# Patient Record
Sex: MALE | Race: WHITE | NOT HISPANIC OR LATINO | Employment: OTHER | URBAN - METROPOLITAN AREA
[De-identification: names, ages, dates, MRNs, and addresses within clinical notes are randomized per-mention and may not be internally consistent; named-entity substitution may affect disease eponyms.]

---

## 2017-05-30 ENCOUNTER — COMMUNICATION - HEALTHEAST (OUTPATIENT)
Dept: INTERNAL MEDICINE | Facility: CLINIC | Age: 67
End: 2017-05-30

## 2017-07-29 ENCOUNTER — COMMUNICATION - HEALTHEAST (OUTPATIENT)
Dept: INTERNAL MEDICINE | Facility: CLINIC | Age: 67
End: 2017-07-29

## 2017-07-29 DIAGNOSIS — E78.5 HYPERLIPIDEMIA: ICD-10-CM

## 2017-08-10 ENCOUNTER — COMMUNICATION - HEALTHEAST (OUTPATIENT)
Dept: INTERNAL MEDICINE | Facility: CLINIC | Age: 67
End: 2017-08-10

## 2017-10-16 ENCOUNTER — COMMUNICATION - HEALTHEAST (OUTPATIENT)
Dept: INTERNAL MEDICINE | Facility: CLINIC | Age: 67
End: 2017-10-16

## 2017-10-17 ENCOUNTER — RECORDS - HEALTHEAST (OUTPATIENT)
Dept: ADMINISTRATIVE | Facility: OTHER | Age: 67
End: 2017-10-17

## 2017-10-17 ENCOUNTER — OFFICE VISIT - HEALTHEAST (OUTPATIENT)
Dept: INTERNAL MEDICINE | Facility: CLINIC | Age: 67
End: 2017-10-17

## 2017-10-17 ENCOUNTER — AMBULATORY - HEALTHEAST (OUTPATIENT)
Dept: INTERNAL MEDICINE | Facility: CLINIC | Age: 67
End: 2017-10-17

## 2017-10-17 DIAGNOSIS — Z00.00 WELCOME TO MEDICARE PREVENTIVE VISIT: ICD-10-CM

## 2017-10-17 DIAGNOSIS — M25.522 LEFT ELBOW PAIN: ICD-10-CM

## 2017-10-17 DIAGNOSIS — Z51.81 MEDICATION MONITORING ENCOUNTER: ICD-10-CM

## 2017-10-17 DIAGNOSIS — E78.5 HYPERLIPIDEMIA: ICD-10-CM

## 2017-10-17 DIAGNOSIS — M10.9 GOUT: ICD-10-CM

## 2017-10-17 DIAGNOSIS — R63.5 WEIGHT GAIN: ICD-10-CM

## 2017-10-17 DIAGNOSIS — Z12.5 SCREENING FOR MALIGNANT NEOPLASM OF PROSTATE: ICD-10-CM

## 2017-10-17 DIAGNOSIS — F17.201 TOBACCO ABUSE, IN REMISSION: ICD-10-CM

## 2017-10-17 DIAGNOSIS — I10 ESSENTIAL HYPERTENSION: ICD-10-CM

## 2017-10-17 LAB
CHOLEST SERPL-MCNC: 188 MG/DL
FASTING STATUS PATIENT QL REPORTED: ABNORMAL
HDLC SERPL-MCNC: 50 MG/DL
LDLC SERPL CALC-MCNC: 69 MG/DL
PSA SERPL-MCNC: 1.4 NG/ML (ref 0–4.5)
TRIGL SERPL-MCNC: 347 MG/DL

## 2017-10-17 ASSESSMENT — MIFFLIN-ST. JEOR: SCORE: 1750.35

## 2017-10-18 ENCOUNTER — AMBULATORY - HEALTHEAST (OUTPATIENT)
Dept: INTERNAL MEDICINE | Facility: CLINIC | Age: 67
End: 2017-10-18

## 2017-10-18 DIAGNOSIS — F17.201 TOBACCO ABUSE, IN REMISSION: ICD-10-CM

## 2017-10-18 LAB — HCV AB SERPL QL IA: NEGATIVE

## 2017-11-20 ENCOUNTER — COMMUNICATION - HEALTHEAST (OUTPATIENT)
Dept: INTERNAL MEDICINE | Facility: CLINIC | Age: 67
End: 2017-11-20

## 2017-11-20 DIAGNOSIS — I10 ESSENTIAL HYPERTENSION: ICD-10-CM

## 2017-12-12 ENCOUNTER — RECORDS - HEALTHEAST (OUTPATIENT)
Dept: ADMINISTRATIVE | Facility: OTHER | Age: 67
End: 2017-12-12

## 2018-01-03 ENCOUNTER — COMMUNICATION - HEALTHEAST (OUTPATIENT)
Dept: INTERNAL MEDICINE | Facility: CLINIC | Age: 68
End: 2018-01-03

## 2018-01-03 DIAGNOSIS — I10 ESSENTIAL HYPERTENSION: ICD-10-CM

## 2018-01-29 ENCOUNTER — RECORDS - HEALTHEAST (OUTPATIENT)
Dept: ADMINISTRATIVE | Facility: OTHER | Age: 68
End: 2018-01-29

## 2018-04-05 ENCOUNTER — COMMUNICATION - HEALTHEAST (OUTPATIENT)
Dept: INTERNAL MEDICINE | Facility: CLINIC | Age: 68
End: 2018-04-05

## 2018-04-05 DIAGNOSIS — E78.5 HYPERLIPEMIA: ICD-10-CM

## 2018-05-18 ENCOUNTER — RECORDS - HEALTHEAST (OUTPATIENT)
Dept: ADMINISTRATIVE | Facility: OTHER | Age: 68
End: 2018-05-18

## 2018-07-26 ENCOUNTER — COMMUNICATION - HEALTHEAST (OUTPATIENT)
Dept: INTERNAL MEDICINE | Facility: CLINIC | Age: 68
End: 2018-07-26

## 2018-07-26 DIAGNOSIS — E78.5 HYPERLIPIDEMIA: ICD-10-CM

## 2018-08-07 ENCOUNTER — COMMUNICATION - HEALTHEAST (OUTPATIENT)
Dept: INTERNAL MEDICINE | Facility: CLINIC | Age: 68
End: 2018-08-07

## 2018-08-07 DIAGNOSIS — M10.9 GOUT: ICD-10-CM

## 2018-10-01 ENCOUNTER — COMMUNICATION - HEALTHEAST (OUTPATIENT)
Dept: INTERNAL MEDICINE | Facility: CLINIC | Age: 68
End: 2018-10-01

## 2018-10-01 DIAGNOSIS — I10 ESSENTIAL HYPERTENSION: ICD-10-CM

## 2018-10-17 ENCOUNTER — AMBULATORY - HEALTHEAST (OUTPATIENT)
Dept: INTERNAL MEDICINE | Facility: CLINIC | Age: 68
End: 2018-10-17

## 2018-10-17 DIAGNOSIS — Z00.00 ROUTINE HEALTH MAINTENANCE: ICD-10-CM

## 2018-10-23 ENCOUNTER — AMBULATORY - HEALTHEAST (OUTPATIENT)
Dept: INTERNAL MEDICINE | Facility: CLINIC | Age: 68
End: 2018-10-23

## 2018-10-23 ENCOUNTER — RECORDS - HEALTHEAST (OUTPATIENT)
Dept: ADMINISTRATIVE | Facility: OTHER | Age: 68
End: 2018-10-23

## 2018-10-23 ENCOUNTER — OFFICE VISIT - HEALTHEAST (OUTPATIENT)
Dept: INTERNAL MEDICINE | Facility: CLINIC | Age: 68
End: 2018-10-23

## 2018-10-23 DIAGNOSIS — Z51.81 MEDICATION MONITORING ENCOUNTER: ICD-10-CM

## 2018-10-23 DIAGNOSIS — F17.201 TOBACCO ABUSE, IN REMISSION: ICD-10-CM

## 2018-10-23 DIAGNOSIS — Z12.5 SCREENING FOR PROSTATE CANCER: ICD-10-CM

## 2018-10-23 DIAGNOSIS — Z00.00 MEDICARE ANNUAL WELLNESS VISIT, SUBSEQUENT: ICD-10-CM

## 2018-10-23 DIAGNOSIS — I10 ESSENTIAL HYPERTENSION: ICD-10-CM

## 2018-10-23 DIAGNOSIS — L01.00 IMPETIGO: ICD-10-CM

## 2018-10-23 DIAGNOSIS — M10.9 GOUT: ICD-10-CM

## 2018-10-23 DIAGNOSIS — E78.5 HYPERLIPIDEMIA: ICD-10-CM

## 2018-10-23 LAB
ALBUMIN UR-MCNC: ABNORMAL MG/DL
APPEARANCE UR: ABNORMAL
BACTERIA #/AREA URNS HPF: ABNORMAL HPF
BILIRUB UR QL STRIP: ABNORMAL
COLOR UR AUTO: YELLOW
ERYTHROCYTE [DISTWIDTH] IN BLOOD BY AUTOMATED COUNT: 11.1 % (ref 11–14.5)
GLUCOSE UR STRIP-MCNC: NEGATIVE MG/DL
HCT VFR BLD AUTO: 39.6 % (ref 40–54)
HGB BLD-MCNC: 13.1 G/DL (ref 14–18)
HGB UR QL STRIP: NEGATIVE
KETONES UR STRIP-MCNC: NEGATIVE MG/DL
LEUKOCYTE ESTERASE UR QL STRIP: NEGATIVE
MCH RBC QN AUTO: 31.9 PG (ref 27–34)
MCHC RBC AUTO-ENTMCNC: 33 G/DL (ref 32–36)
MCV RBC AUTO: 96 FL (ref 80–100)
NITRATE UR QL: NEGATIVE
PH UR STRIP: 5.5 [PH] (ref 5–8)
PLATELET # BLD AUTO: 297 THOU/UL (ref 140–440)
PMV BLD AUTO: 8.2 FL (ref 7–10)
RBC # BLD AUTO: 4.1 MILL/UL (ref 4.4–6.2)
RBC #/AREA URNS AUTO: ABNORMAL HPF
SP GR UR STRIP: 1.01 (ref 1–1.03)
SQUAMOUS #/AREA URNS AUTO: ABNORMAL LPF
UROBILINOGEN UR STRIP-ACNC: ABNORMAL
WBC #/AREA URNS AUTO: ABNORMAL HPF
WBC: 9.6 THOU/UL (ref 4–11)

## 2018-10-23 RX ORDER — CHLORAL HYDRATE 500 MG
2 CAPSULE ORAL DAILY
Status: SHIPPED | COMMUNITY
Start: 2018-10-23 | End: 2022-02-04

## 2018-10-23 ASSESSMENT — MIFFLIN-ST. JEOR: SCORE: 1742.41

## 2018-10-24 ENCOUNTER — COMMUNICATION - HEALTHEAST (OUTPATIENT)
Dept: INTERNAL MEDICINE | Facility: CLINIC | Age: 68
End: 2018-10-24

## 2018-10-24 LAB
ALBUMIN SERPL-MCNC: 3.7 G/DL (ref 3.5–5)
ALP SERPL-CCNC: 59 U/L (ref 45–120)
ALT SERPL W P-5'-P-CCNC: 29 U/L (ref 0–45)
ANION GAP SERPL CALCULATED.3IONS-SCNC: 12 MMOL/L (ref 5–18)
AST SERPL W P-5'-P-CCNC: 21 U/L (ref 0–40)
BILIRUB DIRECT SERPL-MCNC: 0.3 MG/DL
BILIRUB SERPL-MCNC: 0.7 MG/DL (ref 0–1)
BUN SERPL-MCNC: 23 MG/DL (ref 8–22)
CALCIUM SERPL-MCNC: 10.6 MG/DL (ref 8.5–10.5)
CHLORIDE BLD-SCNC: 102 MMOL/L (ref 98–107)
CHOLEST SERPL-MCNC: 157 MG/DL
CO2 SERPL-SCNC: 22 MMOL/L (ref 22–31)
CREAT SERPL-MCNC: 1.12 MG/DL (ref 0.7–1.3)
FASTING STATUS PATIENT QL REPORTED: NORMAL
GFR SERPL CREATININE-BSD FRML MDRD: >60 ML/MIN/1.73M2
GLUCOSE BLD-MCNC: 82 MG/DL (ref 70–125)
HDLC SERPL-MCNC: 62 MG/DL
LDLC SERPL CALC-MCNC: 70 MG/DL
POTASSIUM BLD-SCNC: 4.4 MMOL/L (ref 3.5–5)
PROT SERPL-MCNC: 7 G/DL (ref 6–8)
PSA SERPL-MCNC: 0.8 NG/ML (ref 0–4.5)
SODIUM SERPL-SCNC: 136 MMOL/L (ref 136–145)
TRIGL SERPL-MCNC: 123 MG/DL
URATE SERPL-MCNC: 5.4 MG/DL (ref 3–8)

## 2018-10-25 ENCOUNTER — COMMUNICATION - HEALTHEAST (OUTPATIENT)
Dept: INTERNAL MEDICINE | Facility: CLINIC | Age: 68
End: 2018-10-25

## 2018-10-25 DIAGNOSIS — E78.5 HYPERLIPIDEMIA: ICD-10-CM

## 2018-10-25 DIAGNOSIS — R93.89 ABNORMAL CT OF THE CHEST: ICD-10-CM

## 2018-10-29 ENCOUNTER — RECORDS - HEALTHEAST (OUTPATIENT)
Dept: ADMINISTRATIVE | Facility: OTHER | Age: 68
End: 2018-10-29

## 2018-10-31 ENCOUNTER — COMMUNICATION - HEALTHEAST (OUTPATIENT)
Dept: INTERNAL MEDICINE | Facility: CLINIC | Age: 68
End: 2018-10-31

## 2018-10-31 ENCOUNTER — AMBULATORY - HEALTHEAST (OUTPATIENT)
Dept: INTERNAL MEDICINE | Facility: CLINIC | Age: 68
End: 2018-10-31

## 2018-10-31 DIAGNOSIS — R91.8 RIGHT LOWER LOBE LUNG MASS: ICD-10-CM

## 2018-11-01 ENCOUNTER — AMBULATORY - HEALTHEAST (OUTPATIENT)
Dept: SCHEDULING | Facility: CLINIC | Age: 68
End: 2018-11-01

## 2018-11-01 DIAGNOSIS — R91.8 RIGHT LOWER LOBE LUNG MASS: ICD-10-CM

## 2018-11-06 ENCOUNTER — COMMUNICATION - HEALTHEAST (OUTPATIENT)
Dept: INTERNAL MEDICINE | Facility: CLINIC | Age: 68
End: 2018-11-06

## 2018-11-16 ENCOUNTER — COMMUNICATION - HEALTHEAST (OUTPATIENT)
Dept: INTERNAL MEDICINE | Facility: CLINIC | Age: 68
End: 2018-11-16

## 2018-11-16 DIAGNOSIS — I10 ESSENTIAL HYPERTENSION: ICD-10-CM

## 2018-12-12 ENCOUNTER — RECORDS - HEALTHEAST (OUTPATIENT)
Dept: ADMINISTRATIVE | Facility: OTHER | Age: 68
End: 2018-12-12

## 2019-01-04 ENCOUNTER — COMMUNICATION - HEALTHEAST (OUTPATIENT)
Dept: INTERNAL MEDICINE | Facility: CLINIC | Age: 69
End: 2019-01-04

## 2019-01-04 DIAGNOSIS — I10 ESSENTIAL HYPERTENSION: ICD-10-CM

## 2019-01-21 ENCOUNTER — COMMUNICATION - HEALTHEAST (OUTPATIENT)
Dept: INTERNAL MEDICINE | Facility: CLINIC | Age: 69
End: 2019-01-21

## 2019-01-29 ENCOUNTER — OFFICE VISIT - HEALTHEAST (OUTPATIENT)
Dept: INTERNAL MEDICINE | Facility: CLINIC | Age: 69
End: 2019-01-29

## 2019-01-29 DIAGNOSIS — M25.562 ACUTE PAIN OF LEFT KNEE: ICD-10-CM

## 2019-01-29 ASSESSMENT — MIFFLIN-ST. JEOR: SCORE: 1783.24

## 2019-02-25 ENCOUNTER — RECORDS - HEALTHEAST (OUTPATIENT)
Dept: ADMINISTRATIVE | Facility: OTHER | Age: 69
End: 2019-02-25

## 2019-02-27 ENCOUNTER — RECORDS - HEALTHEAST (OUTPATIENT)
Dept: ADMINISTRATIVE | Facility: OTHER | Age: 69
End: 2019-02-27

## 2019-02-27 ENCOUNTER — COMMUNICATION - HEALTHEAST (OUTPATIENT)
Dept: INTERNAL MEDICINE | Facility: CLINIC | Age: 69
End: 2019-02-27

## 2019-02-27 DIAGNOSIS — E78.5 HYPERLIPEMIA: ICD-10-CM

## 2019-04-10 ENCOUNTER — RECORDS - HEALTHEAST (OUTPATIENT)
Dept: ADMINISTRATIVE | Facility: OTHER | Age: 69
End: 2019-04-10

## 2019-05-22 ENCOUNTER — RECORDS - HEALTHEAST (OUTPATIENT)
Dept: ADMINISTRATIVE | Facility: OTHER | Age: 69
End: 2019-05-22

## 2019-08-05 ENCOUNTER — RECORDS - HEALTHEAST (OUTPATIENT)
Dept: ADMINISTRATIVE | Facility: OTHER | Age: 69
End: 2019-08-05

## 2019-08-20 ENCOUNTER — RECORDS - HEALTHEAST (OUTPATIENT)
Dept: ADMINISTRATIVE | Facility: OTHER | Age: 69
End: 2019-08-20

## 2019-09-30 ENCOUNTER — COMMUNICATION - HEALTHEAST (OUTPATIENT)
Dept: INTERNAL MEDICINE | Facility: CLINIC | Age: 69
End: 2019-09-30

## 2019-09-30 DIAGNOSIS — I10 ESSENTIAL HYPERTENSION: ICD-10-CM

## 2019-10-21 ENCOUNTER — AMBULATORY - HEALTHEAST (OUTPATIENT)
Dept: INTERNAL MEDICINE | Facility: CLINIC | Age: 69
End: 2019-10-21

## 2019-10-21 ENCOUNTER — OFFICE VISIT - HEALTHEAST (OUTPATIENT)
Dept: INTERNAL MEDICINE | Facility: CLINIC | Age: 69
End: 2019-10-21

## 2019-10-21 ENCOUNTER — COMMUNICATION - HEALTHEAST (OUTPATIENT)
Dept: LAB | Facility: CLINIC | Age: 69
End: 2019-10-21

## 2019-10-21 ENCOUNTER — RECORDS - HEALTHEAST (OUTPATIENT)
Dept: GENERAL RADIOLOGY | Facility: CLINIC | Age: 69
End: 2019-10-21

## 2019-10-21 DIAGNOSIS — M84.352D STRESS FRACTURE OF LEFT FEMUR WITH ROUTINE HEALING: ICD-10-CM

## 2019-10-21 DIAGNOSIS — Z87.39 HISTORY OF GOUT: ICD-10-CM

## 2019-10-21 DIAGNOSIS — E78.5 HYPERLIPIDEMIA, UNSPECIFIED HYPERLIPIDEMIA TYPE: ICD-10-CM

## 2019-10-21 DIAGNOSIS — M25.562 ACUTE PAIN OF LEFT KNEE: ICD-10-CM

## 2019-10-21 DIAGNOSIS — Z12.5 SCREENING FOR PROSTATE CANCER: ICD-10-CM

## 2019-10-21 DIAGNOSIS — Z51.81 MEDICATION MONITORING ENCOUNTER: ICD-10-CM

## 2019-10-21 DIAGNOSIS — M10.00 IDIOPATHIC GOUT, UNSPECIFIED CHRONICITY, UNSPECIFIED SITE: ICD-10-CM

## 2019-10-21 DIAGNOSIS — I10 ESSENTIAL HYPERTENSION: ICD-10-CM

## 2019-10-21 DIAGNOSIS — Z91.89 RISK OF EXPOSURE TO LYME DISEASE: ICD-10-CM

## 2019-10-21 DIAGNOSIS — R10.31 RIGHT LOWER QUADRANT PAIN: ICD-10-CM

## 2019-10-21 DIAGNOSIS — R10.31 RIGHT GROIN PAIN: ICD-10-CM

## 2019-10-21 LAB — PSA SERPL-MCNC: 1 NG/ML (ref 0–4.5)

## 2019-10-21 ASSESSMENT — MIFFLIN-ST. JEOR: SCORE: 1737.88

## 2019-10-22 ENCOUNTER — COMMUNICATION - HEALTHEAST (OUTPATIENT)
Dept: SCHEDULING | Facility: CLINIC | Age: 69
End: 2019-10-22

## 2019-10-22 ENCOUNTER — AMBULATORY - HEALTHEAST (OUTPATIENT)
Dept: INTERNAL MEDICINE | Facility: CLINIC | Age: 69
End: 2019-10-22

## 2019-10-22 DIAGNOSIS — E78.5 HYPERLIPIDEMIA: ICD-10-CM

## 2019-10-22 DIAGNOSIS — E55.9 VITAMIN D DEFICIENCY: ICD-10-CM

## 2019-10-22 LAB
25(OH)D3 SERPL-MCNC: 15 NG/ML (ref 30–80)
25(OH)D3 SERPL-MCNC: 15 NG/ML (ref 30–80)
B BURGDOR IGG+IGM SER QL: 0.05 INDEX VALUE

## 2019-10-23 ENCOUNTER — RECORDS - HEALTHEAST (OUTPATIENT)
Dept: ADMINISTRATIVE | Facility: OTHER | Age: 69
End: 2019-10-23

## 2019-10-23 ENCOUNTER — RECORDS - HEALTHEAST (OUTPATIENT)
Dept: BONE DENSITY | Facility: CLINIC | Age: 69
End: 2019-10-23

## 2019-10-23 DIAGNOSIS — M84.352D STRESS FRACTURE, LEFT FEMUR, SUBSEQUENT ENCOUNTER FOR FRACTURE WITH ROUTINE HEALING: ICD-10-CM

## 2019-10-27 ENCOUNTER — COMMUNICATION - HEALTHEAST (OUTPATIENT)
Dept: INTERNAL MEDICINE | Facility: CLINIC | Age: 69
End: 2019-10-27

## 2019-11-06 ENCOUNTER — AMBULATORY - HEALTHEAST (OUTPATIENT)
Dept: LAB | Facility: CLINIC | Age: 69
End: 2019-11-06

## 2019-11-06 DIAGNOSIS — I10 ESSENTIAL HYPERTENSION: ICD-10-CM

## 2019-11-06 DIAGNOSIS — M10.00 IDIOPATHIC GOUT, UNSPECIFIED CHRONICITY, UNSPECIFIED SITE: ICD-10-CM

## 2019-11-06 DIAGNOSIS — Z51.81 MEDICATION MONITORING ENCOUNTER: ICD-10-CM

## 2019-11-06 DIAGNOSIS — E78.5 HYPERLIPIDEMIA, UNSPECIFIED HYPERLIPIDEMIA TYPE: ICD-10-CM

## 2019-11-06 LAB
ALBUMIN SERPL-MCNC: 4 G/DL (ref 3.5–5)
ALBUMIN UR-MCNC: NEGATIVE MG/DL
ALP SERPL-CCNC: 75 U/L (ref 45–120)
ALT SERPL W P-5'-P-CCNC: 24 U/L (ref 0–45)
ANION GAP SERPL CALCULATED.3IONS-SCNC: 8 MMOL/L (ref 5–18)
APPEARANCE UR: CLEAR
AST SERPL W P-5'-P-CCNC: 21 U/L (ref 0–40)
BILIRUB DIRECT SERPL-MCNC: 0.2 MG/DL
BILIRUB SERPL-MCNC: 0.6 MG/DL (ref 0–1)
BILIRUB UR QL STRIP: NEGATIVE
BUN SERPL-MCNC: 19 MG/DL (ref 8–22)
CALCIUM SERPL-MCNC: 9.9 MG/DL (ref 8.5–10.5)
CHLORIDE BLD-SCNC: 106 MMOL/L (ref 98–107)
CHOLEST SERPL-MCNC: 159 MG/DL
CO2 SERPL-SCNC: 24 MMOL/L (ref 22–31)
COLOR UR AUTO: YELLOW
CREAT SERPL-MCNC: 1.01 MG/DL (ref 0.7–1.3)
ERYTHROCYTE [DISTWIDTH] IN BLOOD BY AUTOMATED COUNT: 11.6 % (ref 11–14.5)
FASTING STATUS PATIENT QL REPORTED: YES
GFR SERPL CREATININE-BSD FRML MDRD: >60 ML/MIN/1.73M2
GLUCOSE BLD-MCNC: 101 MG/DL (ref 70–125)
GLUCOSE UR STRIP-MCNC: NEGATIVE MG/DL
HCT VFR BLD AUTO: 40.9 % (ref 40–54)
HDLC SERPL-MCNC: 63 MG/DL
HGB BLD-MCNC: 14 G/DL (ref 14–18)
HGB UR QL STRIP: NEGATIVE
KETONES UR STRIP-MCNC: NEGATIVE MG/DL
LDLC SERPL CALC-MCNC: 78 MG/DL
LEUKOCYTE ESTERASE UR QL STRIP: NEGATIVE
MCH RBC QN AUTO: 31.6 PG (ref 27–34)
MCHC RBC AUTO-ENTMCNC: 34.1 G/DL (ref 32–36)
MCV RBC AUTO: 93 FL (ref 80–100)
NITRATE UR QL: NEGATIVE
PH UR STRIP: 7 [PH] (ref 5–8)
PLATELET # BLD AUTO: 233 THOU/UL (ref 140–440)
PMV BLD AUTO: 7.9 FL (ref 7–10)
POTASSIUM BLD-SCNC: 4.1 MMOL/L (ref 3.5–5)
PROT SERPL-MCNC: 7 G/DL (ref 6–8)
RBC # BLD AUTO: 4.41 MILL/UL (ref 4.4–6.2)
SODIUM SERPL-SCNC: 138 MMOL/L (ref 136–145)
SP GR UR STRIP: 1.01 (ref 1–1.03)
TRIGL SERPL-MCNC: 88 MG/DL
URATE SERPL-MCNC: 4.5 MG/DL (ref 3–8)
UROBILINOGEN UR STRIP-ACNC: NORMAL
WBC: 5.8 THOU/UL (ref 4–11)

## 2019-11-12 ENCOUNTER — COMMUNICATION - HEALTHEAST (OUTPATIENT)
Dept: INTERNAL MEDICINE | Facility: CLINIC | Age: 69
End: 2019-11-12

## 2019-11-12 ENCOUNTER — OFFICE VISIT - HEALTHEAST (OUTPATIENT)
Dept: INTERNAL MEDICINE | Facility: CLINIC | Age: 69
End: 2019-11-12

## 2019-11-12 ENCOUNTER — AMBULATORY - HEALTHEAST (OUTPATIENT)
Dept: INTERNAL MEDICINE | Facility: CLINIC | Age: 69
End: 2019-11-12

## 2019-11-12 DIAGNOSIS — I10 ESSENTIAL HYPERTENSION: ICD-10-CM

## 2019-11-12 DIAGNOSIS — F17.201 TOBACCO ABUSE, IN REMISSION: ICD-10-CM

## 2019-11-12 DIAGNOSIS — R10.31 RIGHT GROIN PAIN: ICD-10-CM

## 2019-11-12 DIAGNOSIS — E78.2 MIXED HYPERLIPIDEMIA: ICD-10-CM

## 2019-11-12 DIAGNOSIS — M84.352D STRESS FRACTURE OF LEFT FEMUR WITH ROUTINE HEALING: ICD-10-CM

## 2019-11-12 DIAGNOSIS — Z86.0100 PERSONAL HISTORY OF COLONIC POLYPS: ICD-10-CM

## 2019-11-12 DIAGNOSIS — E55.9 VITAMIN D DEFICIENCY: ICD-10-CM

## 2019-11-12 DIAGNOSIS — M80.00XD OSTEOPOROSIS WITH CURRENT PATHOLOGICAL FRACTURE WITH ROUTINE HEALING, UNSPECIFIED OSTEOPOROSIS TYPE, SUBSEQUENT ENCOUNTER: ICD-10-CM

## 2019-11-12 DIAGNOSIS — N52.9 ERECTILE DYSFUNCTION, UNSPECIFIED ERECTILE DYSFUNCTION TYPE: ICD-10-CM

## 2019-11-12 DIAGNOSIS — M10.00 IDIOPATHIC GOUT, UNSPECIFIED CHRONICITY, UNSPECIFIED SITE: ICD-10-CM

## 2019-11-12 DIAGNOSIS — Z00.00 MEDICARE ANNUAL WELLNESS VISIT, SUBSEQUENT: ICD-10-CM

## 2019-11-12 ASSESSMENT — MIFFLIN-ST. JEOR: SCORE: 1737.88

## 2019-11-20 ENCOUNTER — COMMUNICATION - HEALTHEAST (OUTPATIENT)
Dept: INTERNAL MEDICINE | Facility: CLINIC | Age: 69
End: 2019-11-20

## 2019-11-20 DIAGNOSIS — E78.5 HYPERLIPEMIA: ICD-10-CM

## 2019-11-21 ENCOUNTER — RECORDS - HEALTHEAST (OUTPATIENT)
Dept: ADMINISTRATIVE | Facility: OTHER | Age: 69
End: 2019-11-21

## 2019-12-10 ENCOUNTER — COMMUNICATION - HEALTHEAST (OUTPATIENT)
Dept: INTERNAL MEDICINE | Facility: CLINIC | Age: 69
End: 2019-12-10

## 2019-12-30 ENCOUNTER — COMMUNICATION - HEALTHEAST (OUTPATIENT)
Dept: INTERNAL MEDICINE | Facility: CLINIC | Age: 69
End: 2019-12-30

## 2019-12-30 DIAGNOSIS — I10 ESSENTIAL HYPERTENSION: ICD-10-CM

## 2020-01-30 ENCOUNTER — COMMUNICATION - HEALTHEAST (OUTPATIENT)
Dept: INTERNAL MEDICINE | Facility: CLINIC | Age: 70
End: 2020-01-30

## 2020-01-30 DIAGNOSIS — M10.00 IDIOPATHIC GOUT, UNSPECIFIED CHRONICITY, UNSPECIFIED SITE: ICD-10-CM

## 2020-08-26 ENCOUNTER — RECORDS - HEALTHEAST (OUTPATIENT)
Dept: ADMINISTRATIVE | Facility: OTHER | Age: 70
End: 2020-08-26

## 2020-10-10 ENCOUNTER — COMMUNICATION - HEALTHEAST (OUTPATIENT)
Dept: SCHEDULING | Facility: CLINIC | Age: 70
End: 2020-10-10

## 2020-10-10 DIAGNOSIS — E78.5 HYPERLIPIDEMIA: ICD-10-CM

## 2020-10-30 ENCOUNTER — COMMUNICATION - HEALTHEAST (OUTPATIENT)
Dept: INTERNAL MEDICINE | Facility: CLINIC | Age: 70
End: 2020-10-30

## 2020-10-30 DIAGNOSIS — I10 ESSENTIAL HYPERTENSION: ICD-10-CM

## 2020-11-12 ENCOUNTER — COMMUNICATION - HEALTHEAST (OUTPATIENT)
Dept: INTERNAL MEDICINE | Facility: CLINIC | Age: 70
End: 2020-11-12

## 2020-11-12 DIAGNOSIS — E78.5 HYPERLIPEMIA: ICD-10-CM

## 2020-11-16 ENCOUNTER — OFFICE VISIT - HEALTHEAST (OUTPATIENT)
Dept: INTERNAL MEDICINE | Facility: CLINIC | Age: 70
End: 2020-11-16

## 2020-11-16 ENCOUNTER — AMBULATORY - HEALTHEAST (OUTPATIENT)
Dept: INTERNAL MEDICINE | Facility: CLINIC | Age: 70
End: 2020-11-16

## 2020-11-16 DIAGNOSIS — F17.201 TOBACCO ABUSE, IN REMISSION: ICD-10-CM

## 2020-11-16 DIAGNOSIS — E55.9 VITAMIN D DEFICIENCY: ICD-10-CM

## 2020-11-16 DIAGNOSIS — M10.00 IDIOPATHIC GOUT, UNSPECIFIED CHRONICITY, UNSPECIFIED SITE: ICD-10-CM

## 2020-11-16 DIAGNOSIS — R35.1 NOCTURIA: ICD-10-CM

## 2020-11-16 DIAGNOSIS — Z86.0100 PERSONAL HISTORY OF COLONIC POLYPS: ICD-10-CM

## 2020-11-16 DIAGNOSIS — E78.2 MIXED HYPERLIPIDEMIA: ICD-10-CM

## 2020-11-16 DIAGNOSIS — N52.9 ERECTILE DYSFUNCTION, UNSPECIFIED ERECTILE DYSFUNCTION TYPE: ICD-10-CM

## 2020-11-16 DIAGNOSIS — I10 ESSENTIAL HYPERTENSION: ICD-10-CM

## 2020-11-16 DIAGNOSIS — M81.0 OSTEOPOROSIS WITHOUT CURRENT PATHOLOGICAL FRACTURE, UNSPECIFIED OSTEOPOROSIS TYPE: ICD-10-CM

## 2020-11-16 DIAGNOSIS — Z00.00 MEDICARE ANNUAL WELLNESS VISIT, SUBSEQUENT: ICD-10-CM

## 2020-11-16 DIAGNOSIS — Z12.5 SCREENING FOR MALIGNANT NEOPLASM OF PROSTATE: ICD-10-CM

## 2020-11-16 LAB
ALBUMIN SERPL-MCNC: 4.4 G/DL (ref 3.5–5)
ALBUMIN UR-MCNC: NEGATIVE MG/DL
ALP SERPL-CCNC: 67 U/L (ref 45–120)
ALT SERPL W P-5'-P-CCNC: 31 U/L (ref 0–45)
ANION GAP SERPL CALCULATED.3IONS-SCNC: 13 MMOL/L (ref 5–18)
APPEARANCE UR: CLEAR
AST SERPL W P-5'-P-CCNC: 26 U/L (ref 0–40)
BILIRUB SERPL-MCNC: 0.7 MG/DL (ref 0–1)
BILIRUB UR QL STRIP: NEGATIVE
BUN SERPL-MCNC: 20 MG/DL (ref 8–22)
CALCIUM SERPL-MCNC: 10.3 MG/DL (ref 8.5–10.5)
CHLORIDE BLD-SCNC: 104 MMOL/L (ref 98–107)
CHOLEST SERPL-MCNC: 155 MG/DL
CO2 SERPL-SCNC: 23 MMOL/L (ref 22–31)
COLOR UR AUTO: YELLOW
CREAT SERPL-MCNC: 1.04 MG/DL (ref 0.7–1.3)
ERYTHROCYTE [DISTWIDTH] IN BLOOD BY AUTOMATED COUNT: 10.8 % (ref 11–14.5)
FASTING STATUS PATIENT QL REPORTED: YES
GFR SERPL CREATININE-BSD FRML MDRD: >60 ML/MIN/1.73M2
GLUCOSE BLD-MCNC: 98 MG/DL (ref 70–125)
GLUCOSE UR STRIP-MCNC: NEGATIVE MG/DL
HCT VFR BLD AUTO: 45.3 % (ref 40–54)
HDLC SERPL-MCNC: 69 MG/DL
HGB BLD-MCNC: 15.4 G/DL (ref 14–18)
HGB UR QL STRIP: NEGATIVE
KETONES UR STRIP-MCNC: NEGATIVE MG/DL
LDLC SERPL CALC-MCNC: 53 MG/DL
LEUKOCYTE ESTERASE UR QL STRIP: NEGATIVE
MCH RBC QN AUTO: 33 PG (ref 27–34)
MCHC RBC AUTO-ENTMCNC: 34 G/DL (ref 32–36)
MCV RBC AUTO: 97 FL (ref 80–100)
NITRATE UR QL: NEGATIVE
PH UR STRIP: 7 [PH] (ref 5–8)
PLATELET # BLD AUTO: 244 THOU/UL (ref 140–440)
PMV BLD AUTO: 8.2 FL (ref 7–10)
POTASSIUM BLD-SCNC: 4.2 MMOL/L (ref 3.5–5)
PROT SERPL-MCNC: 7.4 G/DL (ref 6–8)
PSA SERPL-MCNC: 1 NG/ML (ref 0–4.5)
RBC # BLD AUTO: 4.66 MILL/UL (ref 4.4–6.2)
SODIUM SERPL-SCNC: 140 MMOL/L (ref 136–145)
SP GR UR STRIP: 1.02 (ref 1–1.03)
TRIGL SERPL-MCNC: 164 MG/DL
URATE SERPL-MCNC: 3.7 MG/DL (ref 3–8)
UROBILINOGEN UR STRIP-ACNC: NORMAL
WBC: 6.1 THOU/UL (ref 4–11)

## 2020-11-16 RX ORDER — ALLOPURINOL 300 MG/1
300 TABLET ORAL DAILY
Qty: 90 TABLET | Refills: 3 | Status: SHIPPED | OUTPATIENT
Start: 2020-11-16 | End: 2021-10-29

## 2020-11-16 RX ORDER — LOSARTAN POTASSIUM 100 MG/1
100 TABLET ORAL DAILY
Qty: 90 TABLET | Refills: 3 | Status: SHIPPED | OUTPATIENT
Start: 2020-11-16 | End: 2021-12-13

## 2020-11-16 RX ORDER — SILDENAFIL 100 MG/1
100 TABLET, FILM COATED ORAL DAILY PRN
Qty: 6 TABLET | Refills: 11 | Status: SHIPPED | OUTPATIENT
Start: 2020-11-16 | End: 2021-12-31

## 2020-11-16 RX ORDER — SIMVASTATIN 20 MG
20 TABLET ORAL AT BEDTIME
Qty: 90 TABLET | Refills: 3 | Status: SHIPPED | OUTPATIENT
Start: 2020-11-16 | End: 2021-10-21

## 2020-11-16 RX ORDER — FENOFIBRATE 160 MG/1
TABLET ORAL
Qty: 90 TABLET | Refills: 3 | Status: SHIPPED | OUTPATIENT
Start: 2020-11-16 | End: 2021-11-16

## 2020-11-16 ASSESSMENT — MIFFLIN-ST. JEOR: SCORE: 1746.95

## 2020-11-17 LAB
25(OH)D3 SERPL-MCNC: 48.5 NG/ML (ref 30–80)
25(OH)D3 SERPL-MCNC: 48.5 NG/ML (ref 30–80)

## 2020-11-18 ENCOUNTER — COMMUNICATION - HEALTHEAST (OUTPATIENT)
Dept: INTERNAL MEDICINE | Facility: CLINIC | Age: 70
End: 2020-11-18

## 2020-11-23 ENCOUNTER — RECORDS - HEALTHEAST (OUTPATIENT)
Dept: ADMINISTRATIVE | Facility: OTHER | Age: 70
End: 2020-11-23

## 2020-12-03 ENCOUNTER — COMMUNICATION - HEALTHEAST (OUTPATIENT)
Dept: INTERNAL MEDICINE | Facility: CLINIC | Age: 70
End: 2020-12-03

## 2020-12-21 ENCOUNTER — COMMUNICATION - HEALTHEAST (OUTPATIENT)
Dept: INTERNAL MEDICINE | Facility: CLINIC | Age: 70
End: 2020-12-21

## 2021-01-16 ENCOUNTER — COMMUNICATION - HEALTHEAST (OUTPATIENT)
Dept: SCHEDULING | Facility: CLINIC | Age: 71
End: 2021-01-16

## 2021-01-16 DIAGNOSIS — E78.2 MIXED HYPERLIPIDEMIA: ICD-10-CM

## 2021-01-27 ENCOUNTER — COMMUNICATION - HEALTHEAST (OUTPATIENT)
Dept: INTERNAL MEDICINE | Facility: CLINIC | Age: 71
End: 2021-01-27

## 2021-01-27 DIAGNOSIS — M10.00 IDIOPATHIC GOUT, UNSPECIFIED CHRONICITY, UNSPECIFIED SITE: ICD-10-CM

## 2021-02-04 ENCOUNTER — COMMUNICATION - HEALTHEAST (OUTPATIENT)
Dept: INTERNAL MEDICINE | Facility: CLINIC | Age: 71
End: 2021-02-04

## 2021-02-04 DIAGNOSIS — I10 ESSENTIAL HYPERTENSION: ICD-10-CM

## 2021-02-04 RX ORDER — AMLODIPINE BESYLATE 5 MG/1
5 TABLET ORAL DAILY
Qty: 90 TABLET | Refills: 2 | Status: SHIPPED | OUTPATIENT
Start: 2021-02-04 | End: 2021-07-28

## 2021-02-05 ENCOUNTER — COMMUNICATION - HEALTHEAST (OUTPATIENT)
Dept: INTERNAL MEDICINE | Facility: CLINIC | Age: 71
End: 2021-02-05

## 2021-02-09 ENCOUNTER — AMBULATORY - HEALTHEAST (OUTPATIENT)
Dept: INTERNAL MEDICINE | Facility: CLINIC | Age: 71
End: 2021-02-09

## 2021-02-09 ENCOUNTER — COMMUNICATION - HEALTHEAST (OUTPATIENT)
Dept: INTERNAL MEDICINE | Facility: CLINIC | Age: 71
End: 2021-02-09

## 2021-02-09 DIAGNOSIS — J34.89 RHINORRHEA: ICD-10-CM

## 2021-02-13 ENCOUNTER — AMBULATORY - HEALTHEAST (OUTPATIENT)
Dept: FAMILY MEDICINE | Facility: CLINIC | Age: 71
End: 2021-02-13

## 2021-02-13 DIAGNOSIS — J34.89 RHINORRHEA: ICD-10-CM

## 2021-02-14 LAB
SARS-COV-2 PCR COMMENT: NORMAL
SARS-COV-2 RNA SPEC QL NAA+PROBE: NEGATIVE
SARS-COV-2 VIRUS SPECIMEN SOURCE: NORMAL

## 2021-02-15 ENCOUNTER — COMMUNICATION - HEALTHEAST (OUTPATIENT)
Dept: SCHEDULING | Facility: CLINIC | Age: 71
End: 2021-02-15

## 2021-03-08 ENCOUNTER — COMMUNICATION - HEALTHEAST (OUTPATIENT)
Dept: INTERNAL MEDICINE | Facility: CLINIC | Age: 71
End: 2021-03-08

## 2021-05-27 ENCOUNTER — RECORDS - HEALTHEAST (OUTPATIENT)
Dept: ADMINISTRATIVE | Facility: CLINIC | Age: 71
End: 2021-05-27

## 2021-05-28 ENCOUNTER — RECORDS - HEALTHEAST (OUTPATIENT)
Dept: ADMINISTRATIVE | Facility: CLINIC | Age: 71
End: 2021-05-28

## 2021-05-29 ENCOUNTER — RECORDS - HEALTHEAST (OUTPATIENT)
Dept: ADMINISTRATIVE | Facility: CLINIC | Age: 71
End: 2021-05-29

## 2021-05-31 VITALS — WEIGHT: 213 LBS | HEIGHT: 71 IN | BODY MASS INDEX: 29.82 KG/M2

## 2021-06-01 NOTE — TELEPHONE ENCOUNTER
Refill Approved    Rx renewed per Medication Renewal Policy. Medication was last renewed on 1/4/19.    Mary De La Torre, [facility] [place] Connection Triage/Med Refill 9/30/2019     Requested Prescriptions   Pending Prescriptions Disp Refills     losartan (COZAAR) 100 MG tablet [Pharmacy Med Name: LOSARTAN POTASSIUM 100MG TABS] 90 tablet 2     Sig: TAKE ONE TABLET BY MOUTH EVERY DAY       Angiotensin Receptor Blocker Protocol Passed - 9/30/2019  9:21 AM        Passed - PCP or prescribing provider visit in past 12 months       Last office visit with prescriber/PCP: 1/29/2019 Kalpesh Aldrich MD OR same dept: 1/29/2019 Kalpesh Aldrich MD OR same specialty: 1/29/2019 Kalpesh Aldrich MD  Last physical: 10/23/2018 Last MTM visit: Visit date not found   Next visit within 3 mo: Visit date not found  Next physical within 3 mo: Visit date not found  Prescriber OR PCP: Kalpesh Aldrich MD  Last diagnosis associated with med order: 1. Essential hypertension  - losartan (COZAAR) 100 MG tablet [Pharmacy Med Name: LOSARTAN POTASSIUM 100MG TABS]; TAKE ONE TABLET BY MOUTH EVERY DAY  Dispense: 90 tablet; Refill: 2    If protocol passes may refill for 12 months if within 3 months of last provider visit (or a total of 15 months).             Passed - Serum potassium within the past 12 months     Lab Results   Component Value Date    Potassium 4.4 10/23/2018             Passed - Blood pressure filed in past 12 months     BP Readings from Last 1 Encounters:   01/29/19 130/80             Passed - Serum creatinine within the past 12 months     Creatinine   Date Value Ref Range Status   10/23/2018 1.12 0.70 - 1.30 mg/dL Final

## 2021-06-02 VITALS — BODY MASS INDEX: 30.49 KG/M2 | HEIGHT: 70 IN | WEIGHT: 213 LBS

## 2021-06-02 VITALS — BODY MASS INDEX: 31.78 KG/M2 | WEIGHT: 222 LBS | HEIGHT: 70 IN

## 2021-06-02 NOTE — TELEPHONE ENCOUNTER
Refill Approved    Rx renewed per Medication Renewal Policy. Medication was last renewed on 10/25/18.    Callie Garber, [facility] Connection Triage/Med Refill 10/22/2019     Requested Prescriptions   Pending Prescriptions Disp Refills     simvastatin (ZOCOR) 20 MG tablet 90 tablet 3     Sig: Take 1 tablet (20 mg total) by mouth at bedtime.       Statins Refill Protocol (Hmg CoA Reductase Inhibitors) Passed - 10/22/2019 10:44 AM        Passed - PCP or prescribing provider visit in past 12 months      Last office visit with prescriber/PCP: 10/21/2019 Kalpesh Aldrich MD OR same dept: Visit date not found OR same specialty: Visit date not found  Last physical: 10/23/2018 Last MTM visit: Visit date not found   Next visit within 3 mo: Visit date not found  Next physical within 3 mo: Visit date not found  Prescriber OR PCP: Kalpesh Aldrich MD  Last diagnosis associated with med order: 1. Hyperlipidemia  - simvastatin (ZOCOR) 20 MG tablet; Take 1 tablet (20 mg total) by mouth at bedtime.  Dispense: 90 tablet; Refill: 3    If protocol passes may refill for 12 months if within 3 months of last provider visit (or a total of 15 months).

## 2021-06-02 NOTE — TELEPHONE ENCOUNTER
Dr. Ruiz,  Please place appropriate orders.  Thank you.  Priya HALL, TRACI/CMT....................4:32 PM

## 2021-06-02 NOTE — PROGRESS NOTES
Office Visit - Follow Up   Charles Whipple   69 y.o. male    Date of Visit: 10/21/2019    Chief Complaint   Patient presents with     pain in groin area     right side        Assessment and Plan   1. Right groin pain  Obtain pelvic x-ray to rule out stress fracture.  I suspect musculoligamentous injury that should improve with time and rest.  Exam is unremarkable.  - XR Pelvis W 2 Vw Hip Right; Future    2. Stress fracture of left femur with routine healing  Concern for osteoporosis in male with recurrent stress fracture involving left femur.  We will proceed with DEXA.  Will check vitamin D level.  - Vitamin D, Total (25-Hydroxy)  - DXA Bone Density Scan; Future    3. Risk of exposure to Lyme disease  Multiple exposures to deer ticks  - Lyme Antibody [place]    4. Screening for prostate cancer    - PSA, Annual Screen (Prostatic-Specific Antigen)    Return in about 4 weeks (around 11/18/2019) for Annual physical.     History of Present Illness   This 69 y.o. old diagnosed with stress fracture of distal femur this past winter with development of additional stress fracture on the opposite side of distal femur this summer.  Question of possible osteoporosis which does run in his family.  He is now here with new pain in his right groin present for the past 2 to 3 weeks.  He does not recall any specific injury.  Denies any bulging in the region.  The pain is deep in his right groin.  It is worse with specific movement but not necessarily worse with walking or standing.  Finally, he is concerned about Lyme disease as he has had multiple exposures to deer ticks throughout the summer.  Denies any rashes or flulike symptoms.    Review of Systems:  Otherwise, a comprehensive review of systems was negative except as noted.     Medications, Allergies and Problem List   Patient Active Problem List   Diagnosis     Hyperlipidemia     Gout     Hypertension     COPD (chronic obstructive pulmonary disease) (H)     Weight gain      "Tobacco abuse, in remission     Impetigo     Abnormal CT of the chest     Personal history of colonic polyps     Acute pain of left knee     Stress fracture of left femur with routine healing     Right groin pain     Risk of exposure to Lyme disease       He has a past surgical history that includes lung nodule resection (Right, 9/15).    Allergies   Allergen Reactions     Pentothal [Thiopental Sodium] Nausea And Vomiting     May have infused to rapidly.     Thiopental Nausea And Vomiting       Current Outpatient Medications   Medication Sig Dispense Refill     allopurinol (ZYLOPRIM) 300 MG tablet Take 1 tablet (300 mg total) by mouth daily. 90 tablet 3     amLODIPine (NORVASC) 5 MG tablet Take 1 tablet by mouth daily 90 tablet 3     aspirin 81 mg chewable tablet Chew 81 mg daily.       fenofibrate (TRIGLIDE) 160 MG tablet Take 1 tablet (160 mg total) by mouth daily. 90 tablet 2     losartan (COZAAR) 100 MG tablet Take 1 tablet (100 mg total) by mouth daily. 90 tablet 0     omega-3/dha/epa/fish oil (FISH OIL-OMEGA-3 FATTY ACIDS) 300-1,000 mg capsule Take 2 g by mouth daily.       simvastatin (ZOCOR) 20 MG tablet Take 1 tablet by mouth at bedtime 90 tablet 3     No current facility-administered medications for this visit.         Physical Exam   General Appearance:   Well-appearing middle-age male    /70 (Patient Site: Left Arm, Patient Position: Sitting, Cuff Size: Adult Large)   Pulse 96   Ht 5' 10\" (1.778 m)   Wt 212 lb (96.2 kg)   SpO2 98%   BMI 30.42 kg/m        No inguinal hernia or mass.  Normal testicular exam.    Normal range of motion of right hip including flexion and rotation    Tenderness to deep palpation pain right groin     Additional Information   Social History     Tobacco Use     Smoking status: Former Smoker     Packs/day: 1.00     Years: 40.00     Pack years: 40.00     Last attempt to quit: 2015     Years since quittin.1     Smokeless tobacco: Never Used   Substance Use Topics "     Alcohol use: Yes     Alcohol/week: 21.0 standard drinks     Types: 21 Cans of beer per week     Drug use: No              Kalpesh Aldrich MD

## 2021-06-03 VITALS
HEIGHT: 70 IN | WEIGHT: 212 LBS | OXYGEN SATURATION: 98 % | SYSTOLIC BLOOD PRESSURE: 132 MMHG | BODY MASS INDEX: 30.35 KG/M2 | DIASTOLIC BLOOD PRESSURE: 70 MMHG | HEART RATE: 96 BPM

## 2021-06-03 NOTE — TELEPHONE ENCOUNTER
Refill Approved    Rx renewed per Medication Renewal Policy. Medication was last renewed on 2/27/19.    Callie Garber, [facility] Connection Triage/Med Refill 11/21/2019     Requested Prescriptions   Pending Prescriptions Disp Refills     fenofibrate (TRIGLIDE) 160 MG tablet [Pharmacy Med Name: FENOFIBRATE 160MG TABS] 90 tablet 2     Sig: TAKE ONE TABLET BY MOUTH EVERY DAY       Fenofibrate Refill Protocol Passed - 11/20/2019  9:02 AM        Passed - Renal status in last 6 months     Creatinine   Date Value Ref Range Status   11/06/2019 1.01 0.70 - 1.30 mg/dL Final             Passed - Fasting lipid cascade in last 12 months     Cholesterol   Date Value Ref Range Status   11/06/2019 159 <=199 mg/dL Final     Triglycerides   Date Value Ref Range Status   11/06/2019 88 <=149 mg/dL Final     HDL Cholesterol   Date Value Ref Range Status   11/06/2019 63 >=40 mg/dL Final     LDL Calculated   Date Value Ref Range Status   11/06/2019 78 <=129 mg/dL Final     Patient Fasting > 8hrs?   Date Value Ref Range Status   11/06/2019 Yes  Final             Passed - AST or ALT in last 12 months     AST   Date Value Ref Range Status   11/06/2019 21 0 - 40 U/L Final     ALT   Date Value Ref Range Status   11/06/2019 24 0 - 45 U/L Final               Passed - PCP or prescribing provider visit in past 12 months       Last office visit with prescriber/PCP: 10/21/2019 Kalpesh Aldrich MD OR same dept: 10/21/2019 Kalpesh Aldrich MD OR same specialty: 10/21/2019 Kalpesh Aldrich MD  Last physical: 11/12/2019 Last MTM visit: Visit date not found   Next visit within 3 mo: Visit date not found  Next physical within 3 mo: Visit date not found  Prescriber OR PCP: Kalpesh Aldrich MD  Last diagnosis associated with med order: 1. Hyperlipemia  - fenofibrate (TRIGLIDE) 160 MG tablet [Pharmacy Med Name: FENOFIBRATE 160MG TABS]; TAKE ONE TABLET BY MOUTH EVERY DAY  Dispense: 90 tablet; Refill: 2    If protocol passes may refill for 12  months if within 3 months of last provider visit (or a total of 15 months).

## 2021-06-03 NOTE — PROGRESS NOTES
Assessment and Plan:       1. Medicare annual wellness visit, subsequent  Immunizations are reviewed and everything is up-to-date.  He has a living well.  Former smoker.  Discussed using alcohol in moderation.  Regular exercise discussed.  Up to date with colonoscopies and this should be repeated in 2023.  He declines having prostate exam but his PSA was checked and is normal.  Dementia and depression screening completed.  He sees his ophthalmologist regularly and gets glaucoma screening.  Skin exam performed and recommending regular use of sunblock.  Hepatitis C antibody for screening was normal.  Fasting glucose normal.  No AAA on CT scan in 2015 at age 65.  Consider ultrasound at age 72.      2. Essential hypertension  Good blood pressure control with losartan and amlodipine  - amLODIPine (NORVASC) 5 MG tablet; Take 1 tablet (5 mg total) by mouth daily.  Dispense: 90 tablet; Refill: 3    3. Mixed hyperlipidemia  Lipids well controlled with combination of fenofibrate and simvastatin and tolerating without side effects    4. Osteoporosis with current pathological fracture with routine healing, unspecified osteoporosis type, subsequent encounter  Distal femur stress fracture.  DEXA showing T score -2.0.  Started on vitamin D 5000 units daily as he is running low and we discussed getting 1500 mg of calcium daily supplements and diet.  He enjoys drinking milk and will try to drink 2 glasses daily.  We will have him start 600 mg of Citracal daily.  We discussed benefits of alendronate but he would like to avoid taking because of side effects with osteonecrosis of the jaw.  He did quit smoking 4 years ago and this should help.  Emphasizing weightbearing exercise.  Repeat DEXA in 2 years.  - calcium citrate-vitamin D (CALCIUM CITRATE + D) 315-200 mg-unit per tablet; Take 1 tablet by mouth 2 (two) times a day.; Refill: 0    5. Stress fracture of left femur with routine healing  As above, suspect osteoporosis.  Stress  fracture still healing    6. Vitamin D deficiency  Vitamin D level of 15.  Started taking 5000 units of vitamin D daily and will recheck level in 6 to 12 months    7. Right groin pain  Suspect right  groin strain.  Slowly improving.    8. Tobacco abuse, in remission  Lung Cancer Screening pre-scan counseling Visit    The patient fits the risk profile of patients who benefit from this screening:  -The patient is >55 years old and <80 years old  -The patient has 40 pack year history (over 30)  -The patient has smoked within the past 15 years  -The patient has no medical comorbidity severe enough that it would cause mortality prior to mortality due to the lung cancer attempting to be detected.    Discussion with patient regarding the harms associated with LDCT screening include false-negative and false-positive results, incidental findings, overdiagnosis, and radiation exposure were reviewed at length.   The patient understands that pursuing this screening test may result in a biopsy that was not necessary. It may also produce added stress over a nodule that is likely not cancer.    Of 100 patients who get screening, 25 will have a positive scan. Of those 25, only 1 will have cancer.  Overdiagnosis is estimated at 10% of patients-- they would not have been detected in the patient's lifetime without screening. Less than 1% of patients likely had death related to radiation exposure increase.   Average low-dose CT associated with 0.61 to 1.5 mSv. Annual background radiation exposure in the [place] averages 2.4 mSv; mammogram is 0.7mSv.    The benefits are reduction in risk of death from lung cancer. The number needed to treat is 320 (for every 320 patients who undergo screening, 1 patient will have a benefit in mortality from early detection from the screening).    Undergoing this screening implies willingness to pursue further potentially invasive testing to discover potential cancer.    All questions were  answered.      - CT Low Dose Lung Screening Chest; Future    9. Idiopathic gout, unspecified chronicity, unspecified site  Well-controlled with allopurinol with uric acid under 6.0 and no recent gout attacks    10. Personal history of colonic polyps  Continue colonoscopies every 5 years    Over 25 minutes was spent addressing these chronic and new medical problems beyond time spent performing annual wellness visit with over 50% of the time spent counseling and coordination of care including discussing recent distal femoral stress fracture and osteoporosis, history of chronic tobacco use and risk for lung cancer blood pressure control and ongoing right groin pain    The patient's current medical problems were reviewed.    I have had an Advance Directives discussion with the patient.  The following health maintenance schedule was reviewed with the patient and provided in printed form in the after visit summary:   Health Maintenance   Topic Date Due     MEDICARE ANNUAL WELLNESS VISIT  10/23/2019     FALL RISK ASSESSMENT  11/12/2020     TD 18+ HE  08/06/2022     COLONOSCOPY  12/12/2023     LIPID  11/06/2024     ADVANCE CARE PLANNING  11/12/2024     HEPATITIS C SCREENING  Completed     PNEUMOCOCCAL IMMUNIZATION 65+ LOW/MEDIUM RISK  Completed     INFLUENZA VACCINE RULE BASED  Completed     ZOSTER VACCINES  Completed        Subjective:   Chief Complaint: Charles Whipple is an 69 y.o. male here for an Annual Wellness visit.   HPI: In addition to annual wellness visit, several chronic medical problems discussed at today's visit    Ongoing left knee pain secondary to stress fracture and distal femur.  Underwent work-up for osteoporosis with DEXA showing T score -2.0.  Found to be deficient of vitamin D with level of 15 and now taking 5000 units daily.  He is probably not getting adequate calcium as he has not been drinking milk the last 2 years as he previously did.  He did quit smoking 4 years ago.  Alendronate discussed.  He  is reluctant because of all the dental problems that he has had.  There is strong family history for osteoporosis.    He is using amlodipine and losartan to control blood pressure and tolerating both medications without side effects    Using combination of simvastatin and fenofibrate to manage mixed hyperlipidemia and tolerating without side effects    Review of Systems:    Please see above.  The rest of the review of systems are negative for all systems.    Patient Care Team:  Kalpesh Aldrich MD as PCP - General (Internal Medicine)  Kalpesh Aldrich MD as Assigned PCP     Patient Active Problem List   Diagnosis     Hyperlipidemia     Gout     Hypertension     COPD (chronic obstructive pulmonary disease) (H)     Tobacco abuse, in remission     Abnormal CT of the chest     Personal history of colonic polyps     Stress fracture of left femur with routine healing     Right groin pain     Vitamin D deficiency     Osteoporosis with current pathological fracture with routine healing     Past Medical History:   Diagnosis Date     Acute pain of left knee 1/29/2019    Gout versus tendinitis     COPD (chronic obstructive pulmonary disease) (H)      Gout      Hyperlipidemia      Hypertension      Impaired fasting glucose 10/13/2016    Glucose 113 October 2016 hemoglobin A1c normal at 5.5%     Impetigo 10/23/2018    Staph infection associated with laceration underneath right nose     Left elbow pain 10/17/2017     Lyme disease 2011     Osteoporosis with current pathological fracture with routine healing 11/12/2019    DEXA T score -2.0 but for trabecular bone score and recent recurrent stress fracture of distal femur, begin treatment with alendronate     Osteoporosis with fracture     DEXA T score -2.0 but for trabecular bone score and recent recurrent stress fracture of distal femur, begin treatment with alendronate     Personal history of colonic polyps     Colonoscopy December 2018 without polyps     Pulmonary  nodule 2015    Benign surgical resection 9/15, CT chest normal 2016, continue annually     Reactive depression (situational) 10/13/2016    Brother  suddenly last month after complications from hip fracture     Risk of exposure to Lyme disease 10/21/2019     Screening for abdominal aortic aneurysm     CT scan normal  at age 65.  Consider ultrasound at age 72     Small bowel obstruction (H) 2015     Stress fracture of left femur with routine healing 10/21/2019     Tobacco abuse, in remission 10/13/2016    Benign pulmonary nodule resected , CT chest normal 2016, continue annually     Vitamin D deficiency       Past Surgical History:   Procedure Laterality Date     lung nodule resection Right 9/15    Benign pulmonary nodule      Family History   Problem Relation Age of Onset     Lung cancer Mother      Heart disease Father      Prostate cancer Father      Lung cancer Sister      No Medical Problems Brother         aspiration after hip fx     Breast cancer Sister       Social History     Socioeconomic History     Marital status:      Spouse name: Not on file     Number of children: Not on file     Years of education: Not on file     Highest education level: Not on file   Occupational History     Not on file   Social Needs     Financial resource strain: Not on file     Food insecurity:     Worry: Not on file     Inability: Not on file     Transportation needs:     Medical: Not on file     Non-medical: Not on file   Tobacco Use     Smoking status: Former Smoker     Packs/day: 1.00     Years: 40.00     Pack years: 40.00     Last attempt to quit: 2015     Years since quittin.1     Smokeless tobacco: Never Used   Substance and Sexual Activity     Alcohol use: Yes     Alcohol/week: 21.0 standard drinks     Types: 21 Cans of beer per week     Drug use: No     Sexual activity: Not on file   Lifestyle     Physical activity:     Days per week: Not on file     Minutes per session:  "Not on file     Stress: Not on file   Relationships     Social connections:     Talks on phone: Not on file     Gets together: Not on file     Attends Yazidi service: Not on file     Active member of club or organization: Not on file     Attends meetings of clubs or organizations: Not on file     Relationship status: Not on file     Intimate partner violence:     Fear of current or ex partner: Not on file     Emotionally abused: Not on file     Physically abused: Not on file     Forced sexual activity: Not on file   Other Topics Concern     Not on file   Social History Narrative    , Civil litigation. , no children.      Current Outpatient Medications   Medication Sig Dispense Refill     allopurinol (ZYLOPRIM) 300 MG tablet Take 1 tablet (300 mg total) by mouth daily. 90 tablet 3     amLODIPine (NORVASC) 5 MG tablet Take 1 tablet (5 mg total) by mouth daily. 90 tablet 3     aspirin 81 mg chewable tablet Chew 81 mg daily.       cholecalciferol, vitamin D3, 5,000 unit capsule Take 1 capsule (5,000 Units total) by mouth daily.  0     fenofibrate (TRIGLIDE) 160 MG tablet Take 1 tablet (160 mg total) by mouth daily. 90 tablet 2     losartan (COZAAR) 100 MG tablet Take 1 tablet (100 mg total) by mouth daily. 90 tablet 0     omega-3/dha/epa/fish oil (FISH OIL-OMEGA-3 FATTY ACIDS) 300-1,000 mg capsule Take 2 g by mouth daily.       simvastatin (ZOCOR) 20 MG tablet Take 1 tablet (20 mg total) by mouth at bedtime. 90 tablet 3     No current facility-administered medications for this visit.       Objective:   Vital Signs:   Visit Vitals  /80 (Patient Site: Left Arm, Patient Position: Sitting, Cuff Size: Adult Large)   Pulse (!) 108   Ht 5' 10\" (1.778 m)   Wt 212 lb (96.2 kg)   SpO2 98%   BMI 30.42 kg/m             PHYSICAL EXAM  EYES: Eyelids, conjunctiva, and sclera were normal. Pupils were normal. Cornea, iris, and lens were normal bilaterally.  HEAD, EARS, NOSE, MOUTH, AND THROAT: Head and face were " normal. Nose appearance was normal and there was no discharge. Oropharynx was normal.  NECK: Neck appearance was normal. There were no neck masses and the thyroid was not enlarged and no nodules are felt.  No lymphadenopathy.  RESPIRATORY: Breathing pattern was normal and the chest moved symmetrically.  Percussion/auscultatory percussion was normal.  Lung sounds were normal and there were no rales or wheezes.  CARDIOVASCULAR: Heart rate and rhythm were normal.  S1 and S2 were normal and there were no extra sounds or murmurs. Peripheral pulses in arms and legs were normal.  Jugular venous pressure was normal.  There was no peripheral edema.  No carotid bruits.  GASTROINTESTINAL: The abdomen was normal in contour.  Bowel sounds were present.   Palpation detected no tenderness, mass, or enlarged organs.   MUSCULOSKELETAL: Skeletal configuration was normal and muscle mass was normal for age. Joint appearance was overall normal.  LYMPHATIC: There were no enlarged nodes.  SKIN/HAIR/NAILS: Skin color was normal.  Hair and nails were normal.There were no skin lesions.  NEUROLOGIC: The patient was alert and oriented to person, place, time, and circumstance. Speech was normal. Cranial nerves were normal. Motor strength was normal for age. The patient was normally coordinated.  Sensation intact.  PSYCHIATRIC:  Mood and affect were normal and the patient had normal recent and remote memory. The patient's judgment and insight were normal.    Assessment Results 11/12/2019   Activities of Daily Living No help needed   Instrumental Activities of Daily Living No help needed   Get Up and Go Score Less than 12 seconds   Mini Cog Total Score 5   Some recent data might be hidden     A Mini-Cog score of 0-2 suggests the possibility of dementia, score of 3-5 suggests no dementia    Identified Health Risks:     He is at risk for lack of exercise and has been provided with information to increase physical activity for the benefit of his  well-being.  The patient reports that he drinks more than one alcoholic drink per day but denies binge or excessive drinking. He was counseled and given information about possible harmful effects of excessive alcohol intake.  Patient's advanced directive was discussed and I am comfortable with the patient's wishes.

## 2021-06-04 VITALS
HEIGHT: 70 IN | HEART RATE: 108 BPM | WEIGHT: 212 LBS | SYSTOLIC BLOOD PRESSURE: 134 MMHG | OXYGEN SATURATION: 98 % | DIASTOLIC BLOOD PRESSURE: 80 MMHG | BODY MASS INDEX: 30.35 KG/M2

## 2021-06-04 NOTE — TELEPHONE ENCOUNTER
Who is calling:  Patient    Reason for Call:   Requesting CT of chest be re mailed.  He states he still hasn't received results.    Date of last appointment with primary care: 11/12/19    Okay to leave a detailed message: Yes

## 2021-06-04 NOTE — TELEPHONE ENCOUNTER
Test Results  Who is calling?:  Patient  Who ordered the test:  Dr Aldrich  Type of test: Imaging CT Lung  Date of test:  approx 11/18 or 19th  Where was the test performed:  [facility]  What are your questions/concerns?:  The patient state he has a letter from [facility] that the provider has the results. He is hoping he can get the actual findings on his MyChart with a result letter from Dr Aldrich.   Okay to leave a detailed message?:  Yes [phone]

## 2021-06-04 NOTE — TELEPHONE ENCOUNTER
Please tell Charles that I received the CT report and it is showing no new pulmonary nodules.  Please send him a copy of the report as well.

## 2021-06-04 NOTE — TELEPHONE ENCOUNTER
Dr. Aldrich,  Report has been placed in your in box to review.  Priya HALL, CMA/CMT....................10:22 AM

## 2021-06-04 NOTE — TELEPHONE ENCOUNTER
Spoke with the patient and verified his home address.  CT report has been printed and mailed to the patient's home address on file.  Priya HALL CMA/CRISTINA....................1:34 PM

## 2021-06-05 VITALS
BODY MASS INDEX: 30.64 KG/M2 | DIASTOLIC BLOOD PRESSURE: 78 MMHG | WEIGHT: 214 LBS | HEART RATE: 90 BPM | HEIGHT: 70 IN | SYSTOLIC BLOOD PRESSURE: 120 MMHG | OXYGEN SATURATION: 97 %

## 2021-06-05 NOTE — TELEPHONE ENCOUNTER
Refill Approved    Rx renewed per Medication Renewal Policy. Medication was last renewed on 1/29/19.    Callie Garber, [facility] Triage/Med Refill 1/30/2020     Requested Prescriptions   Pending Prescriptions Disp Refills     allopurinoL (ZYLOPRIM) 300 MG tablet [Pharmacy Med Name: ALLOPURINOL 300MG TABS] 90 tablet 3     Sig: TAKE ONE TABLET BY MOUTH EVERY DAY       Allopurinol/Febuxostat Refill Protocol  Passed - 1/30/2020  9:09 AM        Passed - LFT or AST or ALT in last 12 months     Albumin   Date Value Ref Range Status   11/06/2019 4.0 3.5 - 5.0 g/dL Final     Bilirubin, Total   Date Value Ref Range Status   11/06/2019 0.6 0.0 - 1.0 mg/dL Final     Bilirubin, Direct   Date Value Ref Range Status   11/06/2019 0.2 <=0.5 mg/dL Final     Alkaline Phosphatase   Date Value Ref Range Status   11/06/2019 75 45 - 120 U/L Final     AST   Date Value Ref Range Status   11/06/2019 21 0 - 40 U/L Final     ALT   Date Value Ref Range Status   11/06/2019 24 0 - 45 U/L Final     Protein, Total   Date Value Ref Range Status   11/06/2019 7.0 6.0 - 8.0 g/dL Final                Passed - Visit with PCP or prescribing provider visit in past 12 months     Last office visit with prescriber/PCP: 10/21/2019 Kalpesh Aldrich MD OR same dept: 10/21/2019 Kalpesh Aldrich MD OR same specialty: 10/21/2019 Kalpesh Aldrich MD  Last physical: 11/12/2019 Last MTM visit: Visit date not found   Next visit within 3 mo: Visit date not found  Next physical within 3 mo: Visit date not found  Prescriber OR PCP: Kalpesh Aldrich MD  Last diagnosis associated with med order: There are no diagnoses linked to this encounter.  If protocol passes may refill for 12 months if within 3 months of last provider visit (or a total of 15 months).

## 2021-06-12 NOTE — TELEPHONE ENCOUNTER
Refill Approved    Rx renewed per Medication Renewal Policy. Medication was last renewed on 10/22/2019.    Maritza Villasenor, [facility] Triage/Med Refill 10/12/2020     Requested Prescriptions   Pending Prescriptions Disp Refills     simvastatin (ZOCOR) 20 MG tablet [Pharmacy Med Name: SIMVASTATIN 20MG TABS] 90 tablet 3     Sig: TAKE ONE TABLET BY MOUTH AT BEDTIME       Statins Refill Protocol (Hmg CoA Reductase Inhibitors) Passed - 10/10/2020  4:20 AM        Passed - PCP or prescribing provider visit in past 12 months      Last office visit with prescriber/PCP: 10/21/2019 Kalpesh Aldrich MD OR same dept: Visit date not found OR same specialty: Visit date not found  Last physical: 11/12/2019 Last MTM visit: Visit date not found   Next visit within 3 mo: Visit date not found  Next physical within 3 mo: Visit date not found  Prescriber OR PCP: Kalpesh Aldrich MD  Last diagnosis associated with med order: 1. Hyperlipidemia  - simvastatin (ZOCOR) 20 MG tablet [Pharmacy Med Name: SIMVASTATIN 20MG TABS]; TAKE ONE TABLET BY MOUTH AT BEDTIME  Dispense: 90 tablet; Refill: 3    If protocol passes may refill for 12 months if within 3 months of last provider visit (or a total of 15 months).

## 2021-06-12 NOTE — TELEPHONE ENCOUNTER
Refill Approved    Rx renewed per Medication Renewal Policy. Medication was last renewed on 11/12/19.    Callie Garber, [facility] Triage/Med Refill 11/3/2020     Requested Prescriptions   Pending Prescriptions Disp Refills     amLODIPine (NORVASC) 5 MG tablet [Pharmacy Med Name: AMLODIPINE BESYLATE 5MG TABS] 90 tablet 3     Sig: TAKE ONE TABLET BY MOUTH EVERY DAY       Calcium-Channel Blockers Protocol Passed - 10/30/2020  4:26 AM        Passed - PCP or prescribing provider visit in past 12 months or next 3 months     Last office visit with prescriber/PCP: 10/21/2019 Kalpesh Aldrich MD OR same dept: Visit date not found OR same specialty: 10/21/2019 Kalpesh Aldrich MD  Last physical: 11/12/2019 Last MTM visit: Visit date not found   Next visit within 3 mo: Visit date not found  Next physical within 3 mo: Visit date not found  Prescriber OR PCP: Kalpesh Aldrich MD  Last diagnosis associated with med order: 1. Essential hypertension  - amLODIPine (NORVASC) 5 MG tablet [Pharmacy Med Name: AMLODIPINE BESYLATE 5MG TABS]; TAKE ONE TABLET BY MOUTH EVERY DAY  Dispense: 90 tablet; Refill: 3    If protocol passes may refill for 12 months if within 3 months of last provider visit (or a total of 15 months).             Passed - Blood pressure filed in past 12 months     BP Readings from Last 1 Encounters:   11/12/19 134/80

## 2021-06-13 NOTE — PROGRESS NOTES
Assessment and Plan:       1. Welcome to Medicare preventive visit  Immunizations are reviewed and will provide Prevnar.  He has a living will.  Former smoker quitting in 2015.  We discussed using alcohol in more moderation.  Regular exercise discussed.  Up to date with colonoscopies and this should be repeated in 2018.  He declines having a prostate exam but I will check a PSA for prostate cancer screening.  Dementia and depression screening completed.  He sees his ophthalmologist regularly and gets glaucoma screening.  Skin exam performed and recommending regular use of sunblock.  He does see a dermatologist.  Hepatitis C antibody for screening.  Will screen for diabetes with fasting glucose.   In regards to screening for AAA, he had a normal CT scan of his abdomen in 2015 at age 65.  I would recommend waiting until 72 for a screening ultrasound of his aorta.    - Hepatitis C Antibody (Anti-HCV)    2. Essential hypertension  Blood pressure looks reasonably well-controlled with current dose of amlodipine and losartan.  We discussed that the amlodipine might be causing some slight edema in his ankles  - Basic Metabolic Panel  - Urinalysis    3. Hyperlipidemia  Remains on simvastatin and fenofibrate.  Continue aspirin 81 mg daily.  Recheck lipid profile and monitor LFTs  - Lipid Cascade  - Hepatic Profile    4. Tobacco abuse, in remission  Successfully quit smoking in 2015.  He has gained some weight.  Continue annual low-dose CT scan for lung cancer screening.  Pulmonary nodule resected one year ago and was benign.  - CT Low Dose Lung Screening Chest; Future    5. Gout  No gout attacks on allopurinol.  Monitor uric acid and check CBC  - Uric Acid    6. Left elbow pain  I suspect partial tear of the ligament in his left arm.  This seems to be improving.  Orthopedic consultation if ongoing symptoms beyond the next 4 weeks.  He should avoid any heavy lifting or repetitive activity    7. Weight gain  I suspect related  to smoking cessation but will exclude hypothyroidism.  We discussed strategies to lose weight including getting more regular exercise and restricting his calories.  - Thyroid Stimulating Hormone (TSH)    8. Medication monitoring encounter  Monitoring CBC while on allopurinol  - HM2(CBC w/o Differential)    9. Screening for malignant neoplasm of prostate    - PSA, Annual Screen (Prostatic-Specific Antigen)      The patient's current medical problems were reviewed.    Over 25 minutes was spent addressing these chronic and new medical problems beyond time spent performing welcome to Medicare visit with over 50% of the time spent counseling and coordination of care    I have had an Advance Directives discussion with the patient.  The following health maintenance schedule was reviewed with the patient and provided in printed form in the after visit summary:   Health Maintenance   Topic Date Due     PNEUMOCOCCAL POLYSACCHARIDE VACCINE AGE 65 AND OVER  07/19/2017     DEPRESSION FOLLOW UP  04/17/2018     FALL RISK ASSESSMENT  10/17/2018     COLONOSCOPY  12/12/2018     TD 18+ HE  08/06/2022     ADVANCE DIRECTIVES DISCUSSED WITH PATIENT  10/17/2022     INFLUENZA VACCINE RULE BASED  Completed     TDAP ADULT ONE TIME DOSE  Completed     PNEUMOCOCCAL CONJUGATE VACCINE FOR ADULTS (PCV13 OR PREVNAR)  Completed     ZOSTER VACCINE  Completed        Subjective:   Chief Complaint: Charles Whipple is an 67 y.o. male here for a Welcome to Medicare visit.   HPI: In addition to his welcome to Medicare visit, we discussed his chronic medical problems and some new concerns.  He successfully quit smoking in 2015.  Pulmonary nodule found at that time which was luckily benign when resected.  He has gained some weight.  Not getting much regular exercise and has a tough time restricting his calories.  Denies any worsening dyspnea or chronic cough.  Continues to use amlodipine and losartan to manage his blood pressure.  Also takes combination of  simvastatin and fenofibrate to control his lipids.  Remains on an aspirin daily.  Denies any exertional chest pain.  Discussed his last colonoscopy which was 4 years ago.  He has developed some new pain involving his left elbow.  This began several weeks ago.  He was lifting some heavy objects.  He developed bruising in this area.  Pain persists but he is noticing some improvement but is restricting his activity.  Having ongoing problems with insomnia.  This has been chronic.    Review of Systems:    Please see above.  The rest of the review of systems are negative for all systems.    Patient Care Team:  Kalpesh Aldrich MD as PCP - General (Internal Medicine)     Patient Active Problem List   Diagnosis     Hyperlipidemia     Gout     Hypertension     Personal history of colonic polyps     COPD (chronic obstructive pulmonary disease)     Weight gain     Tobacco abuse, in remission     Left elbow pain     Past Medical History:   Diagnosis Date     COPD (chronic obstructive pulmonary disease)      Gout      Hyperlipidemia      Hypertension      Impaired fasting glucose 10/13/2016    Glucose 113 2016 hemoglobin A1c normal at 5.5%     Left elbow pain 10/17/2017     Lyme disease 2011     Personal history of colonic polyps     Last colonoscopy 2013     Pulmonary nodule 2015    Benign surgical resection 9/15, CT chest normal 2016, continue annually     Reactive depression (situational) 10/13/2016    Brother  suddenly last month after complications from hip fracture     Small bowel obstruction 2015     Tobacco abuse, in remission 10/13/2016    Benign pulmonary nodule resected , CT chest normal 2016, continue annually      Past Surgical History:   Procedure Laterality Date     lung nodule resection Right 9/15    Benign pulmonary nodule      Family History   Problem Relation Age of Onset     Lung cancer Mother      Heart disease Father      Prostate cancer Father      Lung cancer  "Sister      No Medical Problems Brother      aspiration after hip fx     Breast cancer Sister       Social History     Social History     Marital status:      Spouse name: N/A     Number of children: N/A     Years of education: N/A     Occupational History     Not on file.     Social History Main Topics     Smoking status: Former Smoker     Packs/day: 1.00     Years: 40.00     Quit date: 9/4/2015     Smokeless tobacco: Never Used     Alcohol use 12.6 oz/week     21 Cans of beer per week     Drug use: No     Sexual activity: Not on file     Other Topics Concern     Not on file     Social History Narrative    , Civil litigation. , no children.       Current Outpatient Prescriptions   Medication Sig Dispense Refill     allopurinol (ZYLOPRIM) 100 MG tablet Take 2 tablets by mouth every day 60 tablet 11     amLODIPine (NORVASC) 5 MG tablet Take 1 tablet (5 mg total) by mouth daily (generic. Norvasc) 90 tablet 3     aspirin 81 mg chewable tablet Chew 81 mg daily.       fenofibrate (TRIGLIDE) 160 MG tablet Take 1 tablet by mouth daily 90 tablet 3     losartan (COZAAR) 100 MG tablet Take 1 tablet by mouth daily 90 tablet 3     simvastatin (ZOCOR) 20 MG tablet Take 1 tablet by mouth at bedtime 90 tablet 3     No current facility-administered medications for this visit.       Objective:   Vital Signs:   Visit Vitals     /84 (Patient Site: Left Arm, Patient Position: Sitting, Cuff Size: Adult Regular)     Pulse 94     Ht 5' 10.5\" (1.791 m)     Wt 213 lb (96.6 kg)     SpO2 95%     BMI 30.13 kg/m2        EKG:   He had an EKG in 2015 showing normal sinus rhythm with mild LVH.    VisionScreening:   Visual Acuity Screening    Right eye Left eye Both eyes   Without correction: 20/32 20/32 20/25   With correction:           PHYSICAL EXAM  EYES: Eyelids, conjunctiva, and sclera were normal. Pupils were normal. Cornea, iris, and lens were normal bilaterally.  HEAD, EARS, NOSE, MOUTH, AND THROAT: Head and " face were normal. Nose appearance was normal and there was no discharge. Oropharynx was normal.  NECK: Neck appearance was normal. There were no neck masses and the thyroid was not enlarged and no nodules are felt.  No lymphadenopathy.  RESPIRATORY: Breathing pattern was normal and the chest moved symmetrically.  Percussion/auscultatory percussion was normal.  Lung sounds were normal and there were no rales or wheezes.  CARDIOVASCULAR: Heart rate and rhythm were normal.  S1 and S2 were normal and there were no extra sounds or murmurs. Peripheral pulses in arms and legs were normal.  Jugular venous pressure was normal.  There was no peripheral edema.  No carotid bruits.  GASTROINTESTINAL: The abdomen was normal in contour.  Bowel sounds were present.   Palpation detected no tenderness, mass, or enlarged organs.   RECTAL/PROSTATE: He declines having prostate exam  MUSCULOSKELETAL: Skeletal configuration was normal and muscle mass was normal for age. Joint appearance was overall normal.  Unremarkable exam of left elbow.  Normal strength.  Minimal reproducible tenderness.  LYMPHATIC: There were no enlarged nodes.  SKIN/HAIR/NAILS: Skin color was normal.  Hair and nails were normal.There were no skin lesions.  NEUROLOGIC: The patient was alert and oriented to person, place, time, and circumstance. Speech was normal. Cranial nerves were normal. Motor strength was normal for age. The patient was normally coordinated.  Sensation intact.  PSYCHIATRIC:  Mood and affect were normal and the patient had normal recent and remote memory. The patient's judgment and insight were normal.  PHQ 9 score is 7    Assessment Results 10/17/2017   Activities of Daily Living No help needed   Instrumental Activities of Daily Living No help needed   Get Up and Go Score Less than 12 seconds   Mini Cog Total Score 4   Some recent data might be hidden     A Mini Cog score of 0-2 suggests the possibility of dementia, score of 3-5 suggests no  dementia    Identified Health Risks:     He is at risk for lack of exercise and has been provided with information to increase physical activity for the benefit of his well-being.  The patient was counseled and encouraged to consider modifying their diet and eating habits. He was provided with information on recommended healthy diet options.  The patient reports that he drinks more than one alcoholic drink per day but denies binge or excessive drinking. He was counseled and given information about possible harmful effects of excessive alcohol intake.  The patient's PHQ-9 score is consistent with mild depression. He was provided with information regarding depression and this was addressed at today's visit.  She denies feeling depressed and is not interested in medication.  Patient's advanced directive was discussed and I am comfortable with the patient's wishes.

## 2021-06-13 NOTE — TELEPHONE ENCOUNTER
No nodules or worrisome findings seen in the chest.  Dr. Montero should be reaching out to him once he is back and has reviewed his test.  Please make sure that Dr. Montero got his results.

## 2021-06-13 NOTE — TELEPHONE ENCOUNTER
RN cannot approve Refill Request    RN can NOT refill this medication PCP messaged that patient is overdue for Labs. Last office visit: 10/21/2019 Kalpesh Aldrich MD Last Physical: 11/12/2019 Last MTM visit: Visit date not found Last visit same specialty: 10/21/2019 Kalpesh Aldrich MD.  Next visit within 3 mo: Visit date not found  Next physical within 3 mo: Visit date not found      Suzi Montes, [facility] Triage/Med Refill 11/14/2020    Requested Prescriptions   Pending Prescriptions Disp Refills     fenofibrate (TRIGLIDE) 160 MG tablet [Pharmacy Med Name: FENOFIBRATE 160MG TABS] 90 tablet 3     Sig: TAKE ONE TABLET BY MOUTH EVERY DAY       Fenofibrate Refill Protocol Failed - 11/12/2020  4:25 AM        Failed - Renal status in last 6 months     Creatinine   Date Value Ref Range Status   11/06/2019 1.01 0.70 - 1.30 mg/dL Final             Failed - Fasting lipid cascade in last 12 months     Cholesterol   Date Value Ref Range Status   11/06/2019 159 <=199 mg/dL Final     Triglycerides   Date Value Ref Range Status   11/06/2019 88 <=149 mg/dL Final     HDL Cholesterol   Date Value Ref Range Status   11/06/2019 63 >=40 mg/dL Final     LDL Calculated   Date Value Ref Range Status   11/06/2019 78 <=129 mg/dL Final     Patient Fasting > 8hrs?   Date Value Ref Range Status   11/06/2019 Yes  Final             Failed - AST or ALT in last 12 months     AST   Date Value Ref Range Status   11/06/2019 21 0 - 40 U/L Final     ALT   Date Value Ref Range Status   11/06/2019 24 0 - 45 U/L Final               Failed - PCP or prescribing provider visit in past 12 months       Last office visit with prescriber/PCP: 10/21/2019 Kalpesh Aldrich MD OR same dept: Visit date not found OR same specialty: 10/21/2019 Kalpesh Aldrich MD  Last physical: 11/12/2019 Last MTM visit: Visit date not found   Next visit within 3 mo: Visit date not found  Next physical within 3 mo: Visit date not found  Prescriber OR  PCP: Kalpesh Aldrich MD  Last diagnosis associated with med order: 1. Hyperlipemia  - fenofibrate (TRIGLIDE) 160 MG tablet [Pharmacy Med Name: FENOFIBRATE 160MG TABS]; TAKE ONE TABLET BY MOUTH EVERY DAY  Dispense: 90 tablet; Refill: 3    If protocol passes may refill for 12 months if within 3 months of last provider visit (or a total of 15 months).

## 2021-06-13 NOTE — PROGRESS NOTES
Assessment and Plan:     1. Medicare annual wellness visit, subsequent  Immunizations are reviewed and everything is up-to-date.  He has a living well.  Former smoker.  Discussed using alcohol in moderation.  Regular exercise discussed.  Up to date with colonoscopies and this should be repeated in 2023.  He declines having a prostate exam but I will check a PSA for prostate cancer screening.  Dementia and depression screening completed.  He sees his ophthalmologist every year. Skin exam performed and recommending regular use of sunblock.  He sees a dermatologist every year.  Hepatitis C antibody for screening was normal.  Will screen for diabetes with fasting glucose.  No AAA on CT scan in 2015 at age 65.  Consider ultrasound at age 72.        3. Tobacco abuse, in remission  Lung Cancer Screening pre-scan counseling Visit    The patient fits the risk profile of patients who benefit from this screening:  -The patient is >55 years old and <80 years old (55-77 years for Medicare patients)  -The patient has 40 pack year history (over 30)  -The patient has smoked within the past 15 years  -The patient has no medical comorbidity severe enough that it would cause mortality prior to mortality due to the lung cancer attempting to be detected.    Discussion with patient regarding the harms associated with LDCT screening include false-negative and false-positive results, incidental findings, overdiagnosis, and radiation exposure were reviewed at length.   The patient understands that pursuing this screening test may result in a biopsy that was not necessary. It may also produce added stress over a nodule that is likely not cancer.    Of 100 patients who get screening, 25 will have a positive scan. Of those 25, only 1 will have cancer.  Overdiagnosis is estimated at 10% of patients-- they would not have been detected in the patient's lifetime without screening. Less than 1% of patients likely had death related to radiation  exposure increase.   Average low-dose CT associated with 0.61 to 1.5 mSv. Annual background radiation exposure in the [place] averages 2.4 mSv; mammogram is 0.7mSv.    The benefits are reduction in risk of death from lung cancer. The number needed to treat is 320 (for every 320 patients who undergo screening, 1 patient will have a benefit in mortality from early detection from the screening).    Undergoing this screening implies willingness to pursue further potentially invasive testing to discover potential cancer.    All questions were answered.    The patient was counseled regarding smoking cessation and its risk for lung cancer.  - CT Low Dose Lung Screening Chest; Future    4. Essential hypertension  Good blood pressure control with current medication  - losartan (COZAAR) 100 MG tablet; Take 1 tablet (100 mg total) by mouth daily.  Dispense: 90 tablet; Refill: 3  - HM2(CBC w/o Differential)  - Comprehensive Metabolic Panel  - Urinalysis-UC if Indicated    5. Osteoporosis without current pathological fracture, unspecified osteoporosis type  DEXA with T score -2.0 by distal femur fracture consistent with osteoporosis.  Has declined taking any medication after alendronate recommended.  Emphasizing importance of getting 1500 mg of calcium daily in his diet.  Not currently taking supplements as recommended.  Maintain good vitamin D level.  Consider repeating DEXA in 1 to 2 years.    6. Vitamin D deficiency  Rechecking vitamin D level  - Vitamin D, Total (25-Hydroxy)    7. Idiopathic gout, unspecified chronicity, unspecified site  No recurrent gout attacks stable with allopurinol  - allopurinoL (ZYLOPRIM) 300 MG tablet; Take 1 tablet (300 mg total) by mouth daily.  Dispense: 90 tablet; Refill: 3  - Uric Acid    8. Mixed hyperlipidemia  Recheck lipid profile on combination of simvastatin and fenofibrate  - simvastatin (ZOCOR) 20 MG tablet; Take 1 tablet (20 mg total) by mouth at bedtime.  Dispense: 90 tablet;  Refill: 3  - Lipid Cascade FASTING    9. Erectile dysfunction, unspecified erectile dysfunction type  Using Viagra as needed  - sildenafiL (VIAGRA) 100 MG tablet; Take 1 tablet (100 mg total) by mouth daily as needed for erectile dysfunction.  Dispense: 6 tablet; Refill: 11    10. Personal history of colonic polyps  Colonoscopy will be due 2023    11. Nocturia  Increasing nocturia awakening every 1-2 hours at night.  Declines having prostate exam but will check PSA for prostate cancer screening.  Suspect BPH.  We discussed Flomax and he will consider.  - Urinalysis-UC if Indicated    12. Screening for malignant neoplasm of prostate    - PSA (Prostatic-Specific Antigen), Annual Screen    Over 25 minutes was spent addressing these chronic and new medical problems beyond time spent performing annual wellness visit with over 50% of the time spent counseling and coordination of care including discussing nocturia, hypertension management, history of distal femur fracture and osteoporosis, gout    The patient's current medical problems were reviewed.    I have had an Advance Directives discussion with the patient.  The following health maintenance schedule was reviewed with the patient and provided in printed form in the after visit summary:   Health Maintenance   Topic Date Due     SPIROMETRY  1950     COPD ACTION PLAN  1950     MEDICARE ANNUAL WELLNESS VISIT  11/16/2021     FALL RISK ASSESSMENT  11/16/2021     TD 18+ HE  08/06/2022     LIPID  11/06/2024     ADVANCE CARE PLANNING  11/12/2024     COLORECTAL CANCER SCREENING  12/12/2028     HEPATITIS C SCREENING  Completed     Pneumococcal Vaccine: 65+ Years  Completed     INFLUENZA VACCINE RULE BASED  Completed     ZOSTER VACCINES  Completed     Pneumococcal Vaccine: Pediatrics (0 to 5 Years) and At-Risk Patients (6 to 64 Years)  Aged Out     HEPATITIS B VACCINES  Aged Out        Subjective:   Chief Complaint: Charles Whipple is an 70 y.o. male here for an  Annual Wellness visit.   HPI: In addition to annual wellness visit, multiple chronic medical problems discussed at today's visit.    Former smoker quitting 5 years ago.  Denies any cough or dyspnea.  Has been getting annual low-dose CT scan for lung cancer screening.  Previous pulmonary nodule resected likely benign.    Increasing nocturia awakening every 1-2 hours throughout the night.  No gross hematuria.    History of distal femur fracture with concerns for osteoporosis.  DEXA with T score -2.0.  Alendronate recommended but he declined taking for the medication.  Not taking calcium supplements and we discussed getting adequate calcium in his diet.    Continues on losartan and amlodipine to manage hypertension.  Tolerating medications without side effects.        Review of Systems:    Please see above.  The rest of the review of systems are negative for all systems.    Patient Care Team:  Kalpesh Aldrich MD as PCP - General (Internal Medicine)  Kalpesh Aldrich MD as Assigned PCP     Patient Active Problem List   Diagnosis     Hyperlipidemia     Gout     Hypertension     COPD (chronic obstructive pulmonary disease) (H)     Abnormal CT of the chest     Personal history of colonic polyps     Stress fracture of left femur with routine healing     Right groin pain     Vitamin D deficiency     Tobacco abuse, in remission     Osteoporosis without current pathological fracture     Nocturia     Past Medical History:   Diagnosis Date     Acute pain of left knee 01/29/2019    Gout versus tendinitis     COPD (chronic obstructive pulmonary disease) (H)      Gout      Hyperlipidemia      Hypertension      Impaired fasting glucose 10/13/2016    Glucose 113 October 2016 hemoglobin A1c normal at 5.5%     Impetigo 10/23/2018    Staph infection associated with laceration underneath right nose     Left elbow pain 10/17/2017     Lyme disease 2011     Nocturia 11/16/2020     Osteoporosis without current pathological  fracture     DEXA T score -2.0 but for trabecular bone score and recent recurrent stress fracture of distal femur, declines treatment with alendronate     Personal history of colonic polyps     Colonoscopy 2018 without polyps     Pulmonary nodule 2015    Benign surgical resection 9/15, CT chest normal 2016, continue annually     Reactive depression (situational) 10/13/2016    Brother  suddenly last month after complications from hip fracture     Risk of exposure to Lyme disease 10/21/2019     Screening for abdominal aortic aneurysm     CT scan normal  at age 65.  Consider ultrasound at age 72     Small bowel obstruction (H) 2015     Stress fracture of left femur with routine healing 10/21/2019     Tobacco abuse, in remission 10/13/2016    Benign pulmonary nodule resected , CT chest normal 2016, CT 2019 without pulmonary nodules, repeat in 1 year     Vitamin D deficiency       Past Surgical History:   Procedure Laterality Date     lung nodule resection Right 9/15    Benign pulmonary nodule      Family History   Problem Relation Age of Onset     Lung cancer Mother      Heart disease Father      Prostate cancer Father      Lung cancer Sister      No Medical Problems Brother         aspiration after hip fx     Breast cancer Sister       Social History     Socioeconomic History     Marital status:      Spouse name: Not on file     Number of children: Not on file     Years of education: Not on file     Highest education level: Not on file   Occupational History     Not on file   Social Needs     Financial resource strain: Not on file     Food insecurity     Worry: Not on file     Inability: Not on file     Transportation needs     Medical: Not on file     Non-medical: Not on file   Tobacco Use     Smoking status: Former Smoker     Packs/day: 1.00     Years: 40.00     Pack years: 40.00     Quit date: 2015     Years since quittin.2     Smokeless tobacco:  Never Used   Substance and Sexual Activity     Alcohol use: Yes     Alcohol/week: 21.0 standard drinks     Types: 21 Cans of beer per week     Drug use: No     Sexual activity: Not on file   Lifestyle     Physical activity     Days per week: Not on file     Minutes per session: Not on file     Stress: Not on file   Relationships     Social connections     Talks on phone: Not on file     Gets together: Not on file     Attends Alevism service: Not on file     Active member of club or organization: Not on file     Attends meetings of clubs or organizations: Not on file     Relationship status: Not on file     Intimate partner violence     Fear of current or ex partner: Not on file     Emotionally abused: Not on file     Physically abused: Not on file     Forced sexual activity: Not on file   Other Topics Concern     Not on file   Social History Narrative    , Civil litigation. , no children.      Current Outpatient Medications   Medication Sig Dispense Refill     allopurinoL (ZYLOPRIM) 300 MG tablet Take 1 tablet (300 mg total) by mouth daily. 90 tablet 3     amLODIPine (NORVASC) 5 MG tablet TAKE ONE TABLET BY MOUTH EVERY DAY 90 tablet 0     aspirin 81 mg chewable tablet Chew 81 mg daily.       cholecalciferol, vitamin D3, 5,000 unit capsule Take 1 capsule (5,000 Units total) by mouth daily.  0     losartan (COZAAR) 100 MG tablet Take 1 tablet (100 mg total) by mouth daily. 90 tablet 3     omega-3/dha/epa/fish oil (FISH OIL-OMEGA-3 FATTY ACIDS) 300-1,000 mg capsule Take 2 g by mouth daily.       simvastatin (ZOCOR) 20 MG tablet Take 1 tablet (20 mg total) by mouth at bedtime. 90 tablet 3     fenofibrate (TRIGLIDE) 160 MG tablet TAKE ONE TABLET BY MOUTH EVERY DAY 90 tablet 3     sildenafiL (VIAGRA) 100 MG tablet Take 1 tablet (100 mg total) by mouth daily as needed for erectile dysfunction. 6 tablet 11     No current facility-administered medications for this visit.       Objective:   Vital Signs:  "  Visit Vitals  /78   Pulse 90   Ht 5' 10\" (1.778 m)   Wt 214 lb (97.1 kg)   SpO2 97%   BMI 30.71 kg/m           VisionScreening:  No exam data present     PHYSICAL EXAM  EYES: Eyelids, conjunctiva, and sclera were normal. Pupils were normal. Cornea, iris, and lens were normal bilaterally.  HEAD, EARS, NOSE, MOUTH, AND THROAT: Head and face were normal.  TMs normal  NECK: Neck appearance was normal. There were no neck masses and the thyroid was not enlarged and no nodules are felt.  No lymphadenopathy.  RESPIRATORY: Breathing pattern was normal and the chest moved symmetrically.  Percussion/auscultatory percussion was normal.  Lung sounds were normal and there were no rales or wheezes.  CARDIOVASCULAR: Heart rate and rhythm were normal.  S1 and S2 were normal and there were no extra sounds or murmurs. Peripheral pulses in arms and legs were normal.  Jugular venous pressure was normal.  There was no peripheral edema.  No carotid bruits.  GASTROINTESTINAL: The abdomen was normal in contour.  Bowel sounds were present.   Palpation detected no tenderness, mass, or enlarged organs.   MUSCULOSKELETAL: Skeletal configuration was normal and muscle mass was normal for age. Joint appearance was overall normal.  LYMPHATIC: There were no enlarged nodes.  SKIN/HAIR/NAILS: Skin color was normal.  Hair and nails were normal.There were no skin lesions.  NEUROLOGIC: The patient was alert and oriented to person, place, time, and circumstance. Speech was normal. Cranial nerves were normal. Motor strength was normal for age. The patient was normally coordinated.  Sensation intact.  PSYCHIATRIC:  Mood and affect were normal and the patient had normal recent and remote memory. The patient's judgment and insight were normal.    Assessment Results 11/16/2020   Activities of Daily Living No help needed   Instrumental Activities of Daily Living No help needed   Get Up and Go Score Less than 12 seconds   Mini Cog Total Score 5   Some " recent data might be hidden     A Mini-Cog score of 0-2 suggests the possibility of dementia, score of 3-5 suggests no dementia      Identified Health Risks:     He is at risk for lack of exercise and has been provided with information to increase physical activity for the benefit of his well-being.  The patient reports that he drinks more than one alcoholic drink per day but denies binge or excessive drinking. He was counseled and given information about possible harmful effects of excessive alcohol intake.  Patient's advanced directive was discussed and I am comfortable with the patient's wishes.

## 2021-06-15 NOTE — TELEPHONE ENCOUNTER
Refill Approved    Rx renewed per Medication Renewal Policy. Medication was last renewed on 11/3/20, last OV 11/16/20.    Suzi Montes, [facility] Triage/Med Refill 2/4/2021     Requested Prescriptions   Pending Prescriptions Disp Refills     amLODIPine (NORVASC) 5 MG tablet [Pharmacy Med Name: AMLODIPINE BESYLATE 5MG TABS] 90 tablet 0     Sig: TAKE ONE TABLET BY MOUTH EVERY DAY       Calcium-Channel Blockers Protocol Passed - 2/4/2021 10:53 AM        Passed - PCP or prescribing provider visit in past 12 months or next 3 months     Last office visit with prescriber/PCP: 10/21/2019 Kalpesh Aldrich MD OR same dept: Visit date not found OR same specialty: 10/21/2019 Kalpesh Aldrich MD  Last physical: 11/16/2020 Last MTM visit: Visit date not found   Next visit within 3 mo: Visit date not found  Next physical within 3 mo: Visit date not found  Prescriber OR PCP: Kalpesh Aldrich MD  Last diagnosis associated with med order: 1. Essential hypertension  - amLODIPine (NORVASC) 5 MG tablet [Pharmacy Med Name: AMLODIPINE BESYLATE 5MG TABS]; TAKE ONE TABLET BY MOUTH EVERY DAY  Dispense: 90 tablet; Refill: 0    If protocol passes may refill for 12 months if within 3 months of last provider visit (or a total of 15 months).             Passed - Blood pressure filed in past 12 months     BP Readings from Last 1 Encounters:   11/16/20 120/78

## 2021-06-16 PROBLEM — M84.352D STRESS FRACTURE OF LEFT FEMUR WITH ROUTINE HEALING: Status: ACTIVE | Noted: 2019-10-21

## 2021-06-16 PROBLEM — R35.1 NOCTURIA: Status: ACTIVE | Noted: 2020-11-16

## 2021-06-16 PROBLEM — R10.31 RIGHT GROIN PAIN: Status: ACTIVE | Noted: 2019-10-21

## 2021-06-17 NOTE — TELEPHONE ENCOUNTER
Telephone Encounter by Barby Savgae CMA at 12/3/2020  9:22 AM     Author: Barby Savage CMA Service: -- Author Type: Certified Medical Assistant    Filed: 12/3/2020  9:25 AM Encounter Date: 12/3/2020 Status: Signed    : Barby Savage CMA (Certified Medical Assistant)       Please advise results. They are in media section of epic also.   Daniela Savage CSS

## 2021-06-17 NOTE — PATIENT INSTRUCTIONS - HE
Patient Instructions by Kalpesh Aldrich MD at 11/12/2019  2:20 PM     Author: Kalpesh Aldrich MD Service: -- Author Type: Physician    Filed: 11/12/2019  3:11 PM Encounter Date: 11/12/2019 Status: Addendum    : Kalpesh Aldrich MD (Physician)    Related Notes: Original Note by Kalpesh Aldrich MD (Physician) filed at 11/12/2019  3:06 PM         Patient Education     Exercise for a Healthier Heart  You may wonder how you can improve the health of your heart. If youre thinking about exercise, youre on the right track. You dont need to become an athlete, but you do need a certain amount of brisk exercise to help strengthen your heart. If you have been diagnosed with a heart condition, your doctor may recommend exercise to help stabilize your condition. To help make exercise a habit, choose safe, fun activities.       Be sure to check with your health care provider before starting an exercise program.    Why exercise?  Exercising regularly offers many healthy rewards. It can help you do all of the following:    Improve your blood cholesterol levels to help prevent further heart trouble    Lower your blood pressure to help prevent a stroke or heart attack    Control diabetes, or reduce your risk of getting this disease    Improve your heart and lung function    Reach and maintain a healthy weight    Make your muscles stronger and more limber so you can stay active    Prevent falls and fractures by slowing the loss of bone mass (osteoporosis)    Manage stress better  Exercise tips  Ease into your routine. Set small goals. Then build on them.  Exercise on most days. Aim for a total of 150 or more minutes of moderate to  vigorous intensity activity each week. Consider 40 minutes, 3 to 4 times a week. For best results, activity should last for 40 minutes on average. It is OK to work up to the 40 minute period over time. Examples of moderate-intensity activity is walking one mile in 15 minutes or  30 to 45 minutes of yard work.  Step up your daily activity level. Along with your exercise program, try being more active throughout the day. Walk instead of drive. Do more household tasks or yard work.  Choose one or more activities you enjoy. Walking is one of the easiest things you can do. You can also try swimming, riding a bike, or taking an exercise class.  Stop exercising and call your doctor if you:    Have chest pain or feel dizzy or lightheaded    Feel burning, tightness, pressure, or heaviness in your chest, neck, shoulders, back, or arms    Have unusual shortness of breath    Have increased joint or muscle pain    Have palpitations or an irregular heartbeat      [id number] [employer]. [address] [place] [id number]. All rights reserved. This information is not intended as a substitute for professional medical care. Always follow your healthcare professional's instructions.         Patient Education   Alcohol Use   Many people can enjoy a glass of wine or beer without any negative consequences to their health. According to the Centers for Disease Control and Prevention (CDC), having one or fewer drinks per day for women and two or fewer per day for men is considered moderate drinking.     When people drink more than moderately, it can become concerning. Excessive drinking is defined as consuming 15 drinks or more per week for men and 8 drinks or more per week for women. There are various health problems associated with excessive drinking, which include:    Damage to vital organs like the heart, brain, liver and pancreas    Harm to the digestive tract    Weaken the immune system    Higher risk for heart disease and cancer         Advance Directive  Patients advance directive was discussed and I am comfortable with the patients wishes.  Patient Education   Personalized Prevention Plan  You are due for the preventive services outlined below.  Your care team is available to assist  you in scheduling these services.  If you have already completed any of these items, please share that information with your care team to update in your medical record.  Health Maintenance   Topic Date Due   ? MEDICARE ANNUAL WELLNESS VISIT  11/12/2020   ? FALL RISK ASSESSMENT  11/12/2020   ? TD 18+ HE  08/06/2022   ? COLONOSCOPY  12/12/2023   ? LIPID  11/06/2024   ? ADVANCE CARE PLANNING  11/12/2024   ? HEPATITIS C SCREENING  Completed   ? PNEUMOCOCCAL IMMUNIZATION 65+ LOW/MEDIUM RISK  Completed   ? INFLUENZA VACCINE RULE BASED  Completed   ? ZOSTER VACCINES  Completed         Repeat DEXA in 2 years for osteoporosis screening

## 2021-06-18 NOTE — PATIENT INSTRUCTIONS - HE
Patient Instructions by Kalpesh Aldrich MD at 11/16/2020  8:20 AM     Author: Kalpesh Aldrich MD Service: -- Author Type: Physician    Filed: 11/16/2020  9:01 AM Encounter Date: 11/16/2020 Status: Addendum    : Kalpesh Aldrich MD (Physician)    Related Notes: Original Note by Kalpesh Aldrich MD (Physician) filed at 11/16/2020  8:57 AM         Patient Education     Exercise for a Healthier Heart  You may wonder how you can improve the health of your heart. If youre thinking about exercise, youre on the right track. You dont need to become an athlete, but you do need a certain amount of brisk exercise to help strengthen your heart. If you have been diagnosed with a heart condition, your doctor may recommend exercise to help stabilize your condition. To help make exercise a habit, choose safe, fun activities.       Be sure to check with your health care provider before starting an exercise program.    Why exercise?  Exercising regularly offers many healthy rewards. It can help you do all of the following:    Improve your blood cholesterol levels to help prevent further heart trouble    Lower your blood pressure to help prevent a stroke or heart attack    Control diabetes, or reduce your risk of getting this disease    Improve your heart and lung function    Reach and maintain a healthy weight    Make your muscles stronger and more limber so you can stay active    Prevent falls and fractures by slowing the loss of bone mass (osteoporosis)    Manage stress better  Exercise tips  Ease into your routine. Set small goals. Then build on them.  Exercise on most days. Aim for a total of 150 or more minutes of moderate to  vigorous intensity activity each week. Consider 40 minutes, 3 to 4 times a week. For best results, activity should last for 40 minutes on average. It is OK to work up to the 40 minute period over time. Examples of moderate-intensity activity is walking one mile in 15 minutes or  30 to 45 minutes of yard work.  Step up your daily activity level. Along with your exercise program, try being more active throughout the day. Walk instead of drive. Do more household tasks or yard work.  Choose one or more activities you enjoy. Walking is one of the easiest things you can do. You can also try swimming, riding a bike, or taking an exercise class.  Stop exercising and call your doctor if you:    Have chest pain or feel dizzy or lightheaded    Feel burning, tightness, pressure, or heaviness in your chest, neck, shoulders, back, or arms    Have unusual shortness of breath    Have increased joint or muscle pain    Have palpitations or an irregular heartbeat      [id number] [employer]. [address] [place] [id number]. All rights reserved. This information is not intended as a substitute for professional medical care. Always follow your healthcare professional's instructions.         Patient Education   Alcohol Use   Many people can enjoy a glass of wine or beer without any negative consequences to their health. According to the Centers for Disease Control and Prevention (CDC), having one or fewer drinks per day for women and two or fewer per day for men is considered moderate drinking.     When people drink more than moderately, it can become concerning. Excessive drinking is defined as consuming 15 drinks or more per week for men and 8 drinks or more per week for women. There are various health problems associated with excessive drinking, which include:    Damage to vital organs like the heart, brain, liver and pancreas    Harm to the digestive tract    Weaken the immune system    Higher risk for heart disease and cancer         Advance Directive  Patients advance directive was discussed and I am comfortable with the patients wishes.  Patient Education   Personalized Prevention Plan  You are due for the preventive services outlined below.  Your care team is available to assist  you in scheduling these services.  If you have already completed any of these items, please share that information with your care team to update in your medical record.  Health Maintenance   Topic Date Due   ? SPIROMETRY  1950   ? COPD ACTION PLAN  1950   ? MEDICARE ANNUAL WELLNESS VISIT  11/16/2021   ? FALL RISK ASSESSMENT  11/16/2021   ? TD 18+ HE  08/06/2022   ? LIPID  11/06/2024   ? ADVANCE CARE PLANNING  11/12/2024   ? COLORECTAL CANCER SCREENING  12/12/2028   ? HEPATITIS C SCREENING  Completed   ? Pneumococcal Vaccine: 65+ Years  Completed   ? INFLUENZA VACCINE RULE BASED  Completed   ? ZOSTER VACCINES  Completed   ? Pneumococcal Vaccine: Pediatrics (0 to 5 Years) and At-Risk Patients (6 to 64 Years)  Aged Out   ? HEPATITIS B VACCINES  Aged Out         Colonoscopy will be due 2023    Continue to see your eye doctor every year    Continue to see your dermatologist every year    Schedule low-dose CT scan of chest for lung cancer screening    You should try to get 1500 mg of calcium every day in your diet.  This is the equivalent of 5 servings of dairy per day.    I would recommend Flomax (tamsulosin) for BPH symptoms of frequent urination throughout the night

## 2021-06-19 NOTE — LETTER
Letter by Kalpesh Aldrich MD at      Author: Kalpesh Aldrich MD Service: -- Author Type: --    Filed:  Encounter Date: 10/27/2019 Status: Signed         Charles Whipple  [address] [place] [address]             October 27, 2019         Dear Charles,    Below are the results from your recent visit:    Resulted Orders   DXA Bone Density Scan    Narrative    10/23/2019      RE: Charles Whipple  YOB: 1950        Dear Kalpesh Aldrich,    Patient Profile:  69 y.o. male, is here for the first bone density test.   History of fractures - Yes;  Femur. Family history of osteoporosis - Yes;    mother and sibling.  Family history of hip fracture: None. Smoking history   - Past. Osteoporosis treatment past -  No. Osteoporosis treatment current   - No.  Chronic medical problems - None. High risk medications -  None.      Assessment:    1. The spine bone density L1-L4 with T-score -2.0.  2. Femoral bone densities show left femoral neck T- score -1.4 and right   femoral neck T-score -1.9.  3. Trabecular bone score indicates poor trabecular bone architecture.      69 y.o. male with LOW BONE DENSITY (OSTEOPENIA) and MODERATE fracture   risk, adjusted for the TBS, with major osteoporotic fracture risk 13.6%   and hip fracture risk 2.9%.   Clinical diagnosis of osteoporosis can be established if the fracture was   typical fragility fracture and, in that case, treatment is recommended.      Recommendations:  Appropriate calcium, vitamin D supplements, along with balance and weight   bearing exercise recommended with follow up bone density scan in 2 years.      Bone densitometry was performed on your patient using our [email or web address]   densitometer. The results are summarized and a copy of the actual scans   are included for your review. In conformity with the International Society   of Clinical Densitometry's most recent position statement for DXA   interpretation (2015), the diagnosis will be made on the  lowest measured   T-score of the lumbar spine, femoral neck, total proximal femur or 33%   radius. Note the change in terminology for diagnostic classification from   OSTEOPENIA to LOW BONE MASS. All trending for sequential exams will be   done using multiple vertebrae or the total proximal femur. Fracture risk   is based on the WHO Fracture Risk Assessment Tool (FRAX). If additional   information is needed or if you would like to discuss the results, please   do not hesitate to call me.       Thank you for referring this patient to [facility].   We are happy to be of service in support of you and your practice. If you   have any questions or suggestions to improve our service, please call me   at [phone].     Sincerely,     Everton Wesley M.D. C.C.BELEN.  Osteoporosis Services, [facility]         Your DEXA is abnormal with a T score of -2.0 consistent with osteopenia (bone loss).  While this does not fall into the osteoporosis range, you do also have a low trabecular bone score and with the recent stress fractures, I think you do have osteoporosis and would benefit from treatment.  I would recommend starting alendronate once weekly.  We can discuss further at your upcoming appointment in November.    Please call with questions or contact us using [employer]    Sincerely,        Electronically signed by Kalpesh Aldrich MD

## 2021-06-21 NOTE — PROGRESS NOTES
Assessment and Plan:       1. Medicare annual wellness visit, subsequent  Immunizations are reviewed and providing Pneumovax.  Already had flu shot.  Recommending Shingrix.  He has a living will.  Former smoker.  Discussed using alcohol in moderation.  Regular exercise discussed.  Up to date with colonoscopies and this should be repeated in December of this year.  Declines prostate exam but will check PSA for prostate cancer screening.  Dementia and depression screening completed.  He sees his ophthalmologist regularly and gets glaucoma screening.  Skin exam performed and recommending regular use of sunblock.  Hepatitis C antibody was normal.  Will screen for diabetes with fasting glucose.  Normal CT without AAA 2015 at age 65.  Consider ultrasound at age 72.      2. Essential hypertension  Excellent blood pressure control with current dose of losartan and amlodipine  - Basic Metabolic Panel  - Urinalysis    3. Hyperlipidemia  Recheck lipid profile using simvastatin and fenofibrate.  Continues aspirin 81 mg daily  - Lipid Cascade    4. Tobacco abuse, in remission  Lung Cancer Screening pre-scan counseling Visit    The patient fits the risk profile of patients who benefit from this screening:  -The patient is >55 years old and <80 years old  -The patient has 40 pack year history (over 30)  -The patient has smoked within the past 15 years  -The patient has no medical comorbidity severe enough that it would cause mortality prior to mortality due to the lung cancer attempting to be detected.    Discussion with patient regarding the harms associated with LDCT screening include false-negative and false-positive results, incidental findings, overdiagnosis, and radiation exposure were reviewed at length.   The patient understands that pursuing this screening test may result in a biopsy that was not necessary. It may also produce added stress over a nodule that is likely not cancer.    Of 100 patients who get screening, 25  will have a positive scan. Of those 25, only 1 will have cancer.  Overdiagnosis is estimated at 10% of patients-- they would not have been detected in the patient's lifetime without screening. Less than 1% of patients likely had death related to radiation exposure increase.   Average low-dose CT associated with 0.61 to 1.5 mSv. Annual background radiation exposure in the [place] averages 2.4 mSv; mammogram is 0.7mSv.    The benefits are reduction in risk of death from lung cancer. The number needed to treat is 320 (for every 320 patients who undergo screening, 1 patient will have a benefit in mortality from early detection from the screening).    Undergoing this screening implies willingness to pursue further potentially invasive testing to discover potential cancer.    All questions were answered.        The patient's results will be followed in our pulmonary registry and will be recalled based on the findings of the CT scan.  - CT Low Dose Lung Screening Chest; Future    5. Gout  Stable with allopurinol.  - Uric Acid    6. Screening for prostate cancer    - PSA, Annual Screen (Prostatic-Specific Antigen)    7. Medication monitoring encounter  Monitor LFTs while on statin and CBC while on allopurinol  - Hepatic Profile  - HM2(CBC w/o Differential)    8.  Impetigo/staph infection following cut while shaving.  Will use Bactroban cream 3 times daily for 7 days        The patient's current medical problems were reviewed.    Over 25 minutes was spent addressing these chronic and new medical problems beyond time spent performing annual wellness visit with over 50% of the time spent counseling and coordination of care    I have had an Advance Directives discussion with the patient.  The following health maintenance schedule was reviewed with the patient and provided in printed form in the after visit summary:   Health Maintenance   Topic Date Due     COLONOSCOPY  12/12/2018     FALL RISK ASSESSMENT  10/23/2019      TD 18+ HE  08/06/2022     ADVANCE DIRECTIVES DISCUSSED WITH PATIENT  10/23/2023     PNEUMOCOCCAL POLYSACCHARIDE VACCINE AGE 65 AND OVER  Completed     INFLUENZA VACCINE RULE BASED  Completed     PNEUMOCOCCAL CONJUGATE VACCINE FOR ADULTS (PCV13 OR PREVNAR)  Completed     ZOSTER VACCINE  Completed        Subjective:   Chief Complaint: Charles Whipple is an 68 y.o. male here for an Annual Wellness visit.     HPI: In addition to wellness visit, multiple chronic medical problems discussed at today's visit    Former smoker quitting in 2015.  Lung nodule with resection proved to be benign.  Denies any new cough or dyspnea.    Blood pressure well controlled with combination of losartan and amlodipine    No gout attacks, stable with allopurinol    Using simvastatin to control cholesterol.  Denies exertional chest pain.  Needs to get more regular exercise.    He developed increasing redness and pain following cutting himself shaving underneath right nose, turning more red with concerns for infection    Review of Systems:    Please see above.  The rest of the review of systems are negative for all systems.    Patient Care Team:  Kalpesh Aldrich MD as PCP - General (Internal Medicine)     Patient Active Problem List   Diagnosis     Hyperlipidemia     Gout     Hypertension     Personal history of colonic polyps     COPD (chronic obstructive pulmonary disease) (H)     Weight gain     Tobacco abuse, in remission     Left elbow pain     Past Medical History:   Diagnosis Date     COPD (chronic obstructive pulmonary disease) (H)      Gout      Hyperlipidemia      Hypertension      Impaired fasting glucose 10/13/2016    Glucose 113 October 2016 hemoglobin A1c normal at 5.5%     Left elbow pain 10/17/2017     Lyme disease 2011     Personal history of colonic polyps     Last colonoscopy 2013     Pulmonary nodule 09/11/2015    Benign surgical resection 9/15, CT chest normal October 2016, continue annually     Reactive depression  (situational) 10/13/2016    Brother  suddenly last month after complications from hip fracture     Screening for abdominal aortic aneurysm     CT scan normal  at age 65.  Consider ultrasound at age 72     Small bowel obstruction (H) 2015     Tobacco abuse, in remission 10/13/2016    Benign pulmonary nodule resected , CT chest normal 2016, continue annually      Past Surgical History:   Procedure Laterality Date     lung nodule resection Right 9/15    Benign pulmonary nodule      Family History   Problem Relation Age of Onset     Lung cancer Mother      Heart disease Father      Prostate cancer Father      Lung cancer Sister      No Medical Problems Brother      aspiration after hip fx     Breast cancer Sister       Social History     Social History     Marital status:      Spouse name: N/A     Number of children: N/A     Years of education: N/A     Occupational History     Not on file.     Social History Main Topics     Smoking status: Former Smoker     Packs/day: 1.00     Years: 40.00     Quit date: 2015     Smokeless tobacco: Never Used     Alcohol use 12.6 oz/week     21 Cans of beer per week     Drug use: No     Sexual activity: Not on file     Other Topics Concern     Not on file     Social History Narrative    , Civil litigation. , no children.      Current Outpatient Prescriptions   Medication Sig Dispense Refill     allopurinol (ZYLOPRIM) 100 MG tablet Take 2 tablets by mouth every day 60 tablet 10     amLODIPine (NORVASC) 5 MG tablet Take 1 tablet by mouth daily 90 tablet 3     aspirin 81 mg chewable tablet Chew 81 mg daily.       fenofibrate (TRIGLIDE) 160 MG tablet Take 1 tablet by mouth daily 90 tablet 2     losartan (COZAAR) 100 MG tablet Take 1 tablet by mouth daily 90 tablet 0     omega-3/dha/epa/fish oil (FISH OIL-OMEGA-3 FATTY ACIDS) 300-1,000 mg capsule Take 2 g by mouth daily.       simvastatin (ZOCOR) 20 MG tablet Take 1 tablet by mouth at bedtime  "90 tablet 0     No current facility-administered medications for this visit.       Objective:   Vital Signs:   Visit Vitals     /70 (Patient Site: Left Arm, Patient Position: Sitting, Cuff Size: Adult Large)     Pulse (!) 107     Ht 5' 10\" (1.778 m)     Wt 213 lb (96.6 kg)     SpO2 95%     BMI 30.56 kg/m2            PHYSICAL EXAM  EYES: Eyelids, conjunctiva, and sclera were normal. Pupils were normal. Cornea, iris, and lens were normal bilaterally.  HEAD, EARS, NOSE, MOUTH, AND THROAT: Head and face were normal. Nose appearance was normal and there was no discharge. Oropharynx was normal.  NECK: Neck appearance was normal. There were no neck masses and the thyroid was not enlarged and no nodules are felt.  No lymphadenopathy.  RESPIRATORY: Breathing pattern was normal and the chest moved symmetrically.  Percussion/auscultatory percussion was normal.  Lung sounds were normal and there were no rales or wheezes.  CARDIOVASCULAR: Heart rate and rhythm were normal.  S1 and S2 were normal and there were no extra sounds or murmurs. Peripheral pulses in arms and legs were normal.  Jugular venous pressure was normal.  There was no peripheral edema.  No carotid bruits.  GASTROINTESTINAL: The abdomen was normal in contour.  Bowel sounds were present.   Palpation detected no tenderness, mass, or enlarged organs.   Staph infection/impetigo USCULOSKELETAL: Skeletal configuration was normal and muscle mass was normal for age. Joint appearance was overall normal.  LYMPHATIC: There were no enlarged nodes.  SKIN/HAIR/NAILS: Skin color was normal.  Hair and nails were normal.There were no skin lesions.  Staph infection/impetigo underneath right nostril  NEUROLOGIC: The patient was alert and oriented to person, place, time, and circumstance. Speech was normal. Cranial nerves were normal. Motor strength was normal for age. The patient was normally coordinated.  Sensation intact.  PSYCHIATRIC:  Mood and affect were normal and the " patient had normal recent and remote memory. The patient's judgment and insight were normal.  PHQ 9 score is 4    Assessment Results 10/23/2018   Activities of Daily Living No help needed   Instrumental Activities of Daily Living No help needed   Get Up and Go Score Less than 12 seconds   Mini Cog Total Score 5   Some recent data might be hidden     A Mini-Cog score of 0-2 suggests the possibility of dementia, score of 3-5 suggests no dementia    Identified Health Risks:     He is at risk for lack of exercise and has been provided with information to increase physical activity for the benefit of his well-being.  The patient reports that he drinks more than one alcoholic drink per day but denies binge or excessive drinking. He was counseled and given information about possible harmful effects of excessive alcohol intake.  The patient was counseled and encouraged to consider modifying their diet and eating habits. He was provided with information on recommended healthy diet options.  Patient's advanced directive was discussed and I am comfortable with the patient's wishes.

## 2021-06-22 NOTE — TELEPHONE ENCOUNTER
Refill Approved    Rx renewed per Medication Renewal Policy. Medication was last renewed on 10/2/18.    Ov: 10/23/18    Nicci Steinberg, [facility] Triage/Med Refill 1/4/2019     Requested Prescriptions   Pending Prescriptions Disp Refills     losartan (COZAAR) 100 MG tablet 90 tablet 2     Sig: Take 1 tablet (100 mg total) by mouth daily.    Angiotensin Receptor Blocker Protocol Passed - 1/4/2019  9:34 AM       Passed - PCP or prescribing provider visit in past 12 months      Last office visit with prescriber/PCP: 12/24/2015 Kalpesh Aldrich MD OR same dept: Visit date not found OR same specialty: 12/24/2015 Kalpesh Aldrich MD  Last physical: 10/23/2018 Last MTM visit: Visit date not found   Next visit within 3 mo: Visit date not found  Next physical within 3 mo: Visit date not found  Prescriber OR PCP: Kalpesh Aldrich MD  Last diagnosis associated with med order: 1. Essential hypertension  - losartan (COZAAR) 100 MG tablet; Take 1 tablet (100 mg total) by mouth daily.  Dispense: 90 tablet; Refill: 0    If protocol passes may refill for 12 months if within 3 months of last provider visit (or a total of 15 months).            Passed - Serum potassium within the past 12 months    Lab Results   Component Value Date    Potassium 4.4 10/23/2018            Passed - Blood pressure filed in past 12 months    BP Readings from Last 1 Encounters:   10/23/18 128/70            Passed - Serum creatinine within the past 12 months    Creatinine   Date Value Ref Range Status   10/23/2018 1.12 0.70 - 1.30 mg/dL Final

## 2021-06-23 ENCOUNTER — RECORDS - HEALTHEAST (OUTPATIENT)
Dept: ADMINISTRATIVE | Facility: OTHER | Age: 71
End: 2021-06-23

## 2021-06-23 NOTE — PROGRESS NOTES
Office Visit - Follow Up   Charles Whipple   68 y.o. male    Date of Visit: 1/29/2019    Chief Complaint   Patient presents with     Knee Pain     left knee, swollen        Assessment and Plan   1. Acute pain of left knee  Unclear if this is gout versus tendinitis.  Severity of pain seems more consistent with gout.  Will treat with indomethacin 50 mg 3 times daily with meals for 2 days followed by 25 mg 3 times daily for 3 days.  Will increase his dose of allopurinol to 300 mg daily.    Return in about 6 months (around 7/29/2019) for Annual physical.     History of Present Illness   This 68 y.o. old gentleman with history of gout here with acute pain involving left knee.  Symptoms began over the past week.  He does not recall any injury or trauma.  Pain localized to the inside portion of his knee although there is some swelling and stiffness.  Pain can be very severe reminiscent of his past gout attacks.  He does not recall gout involving his knee in the past.  He is concerned about his current allopurinol tablets.  They appear smaller in size compared to tablets he was receiving at his previous pharmacy.  He did discuss with pharmacist and confirms that it is the same dose taking 2 of the 100 mg tablets daily.    Review of Systems: No fevers or chills      Medications, Allergies and Problem List   Patient Active Problem List   Diagnosis     Hyperlipidemia     Gout     Hypertension     COPD (chronic obstructive pulmonary disease) (H)     Weight gain     Tobacco abuse, in remission     Impetigo     Abnormal CT of the chest     Personal history of colonic polyps     Acute pain of left knee       He has a past surgical history that includes lung nodule resection (Right, 9/15).    Allergies   Allergen Reactions     Pentothal [Thiopental Sodium] Nausea And Vomiting     May have infused to rapidly.     Thiopental Nausea And Vomiting       Current Outpatient Medications   Medication Sig Dispense Refill     amLODIPine  "(NORVASC) 5 MG tablet Take 1 tablet by mouth daily 90 tablet 3     aspirin 81 mg chewable tablet Chew 81 mg daily.       fenofibrate (TRIGLIDE) 160 MG tablet Take 1 tablet by mouth daily 90 tablet 2     losartan (COZAAR) 100 MG tablet Take 1 tablet (100 mg total) by mouth daily. 90 tablet 2     omega-3/dha/epa/fish oil (FISH OIL-OMEGA-3 FATTY ACIDS) 300-1,000 mg capsule Take 2 g by mouth daily.       simvastatin (ZOCOR) 20 MG tablet Take 1 tablet by mouth at bedtime 90 tablet 3     allopurinol (ZYLOPRIM) 300 MG tablet Take 1 tablet (300 mg total) by mouth daily. 90 tablet 3     indomethacin (INDOCIN) 25 MG capsule 2 tabs three times a day for 2 days then 1 tab three times a day for 3 days 21 capsule 0     No current facility-administered medications for this visit.         Physical Exam   General Appearance:   Well-appearing middle-aged male    /80 (Patient Site: Left Arm, Patient Position: Sitting, Cuff Size: Adult Large)   Pulse (!) 104   Ht 5' 10\" (1.778 m)   Wt 222 lb (100.7 kg)   SpO2 98%   BMI 31.85 kg/m          Left knee with mild effusion but normal range of motion with flexion and extension.  No pain with lateral forces applied.  There is focal tenderness in the medial aspect of his left knee with palpation.  No warmth or redness.     Additional Information   Social History     Tobacco Use     Smoking status: Former Smoker     Packs/day: 1.00     Years: 40.00     Pack years: 40.00     Last attempt to quit: 9/4/2015     Years since quitting: 3.4     Smokeless tobacco: Never Used   Substance Use Topics     Alcohol use: Yes     Alcohol/week: 12.6 oz     Types: 21 Cans of beer per week     Drug use: No              Kalpesh Aldrich MD  "

## 2021-06-24 NOTE — TELEPHONE ENCOUNTER
Refill Request  Did you contact pharmacy: Yes  Medication name: fenofibrate (TRIGLIDE) 160 MG tablet  Who prescribed the medication: Dr. Aldrich  Pharmacy Name and Location: [facility] in [place]   Is patient out of medication: Yes  Patient notified refills processed in 72 hours:  yes  Okay to leave a detailed message: yes

## 2021-06-24 NOTE — TELEPHONE ENCOUNTER
Refill Approved    Rx renewed per Medication Renewal Policy. Medication was last renewed on 4/7/18.    Callie Garber, [facility] Triage/Med Refill 2/27/2019     Requested Prescriptions   Pending Prescriptions Disp Refills     fenofibrate (TRIGLIDE) 160 MG tablet 90 tablet 2     Sig: Take 1 tablet (160 mg total) by mouth daily.    Fenofibrate Refill Protocol Passed - 2/27/2019 10:41 AM       Passed - Renal status in last 6 months    Creatinine   Date Value Ref Range Status   10/23/2018 1.12 0.70 - 1.30 mg/dL Final            Passed - Fasting lipid cascade in last 12 months    Cholesterol   Date Value Ref Range Status   10/23/2018 157 <=199 mg/dL Final     Triglycerides   Date Value Ref Range Status   10/23/2018 123 <=149 mg/dL Final     HDL Cholesterol   Date Value Ref Range Status   10/23/2018 62 >=40 mg/dL Final     LDL Calculated   Date Value Ref Range Status   10/23/2018 70 <=129 mg/dL Final     Patient Fasting > 8hrs?   Date Value Ref Range Status   10/23/2018 Unknown  Final            Passed - AST or ALT in last 12 months    AST   Date Value Ref Range Status   10/23/2018 21 0 - 40 U/L Final     ALT   Date Value Ref Range Status   10/23/2018 29 0 - 45 U/L Final              Passed - PCP or prescribing provider visit in past 12 months      Last office visit with prescriber/PCP: 1/29/2019 Kalpesh Aldrich MD OR same dept: 1/29/2019 Kalpesh Aldrich MD OR same specialty: 1/29/2019 Kalpesh Aldrich MD  Last physical: 10/23/2018 Last MTM visit: Visit date not found   Next visit within 3 mo: Visit date not found  Next physical within 3 mo: Visit date not found  Prescriber OR PCP: Kalpesh Aldrich MD  Last diagnosis associated with med order: 1. Hyperlipemia  - fenofibrate (TRIGLIDE) 160 MG tablet; Take 1 tablet (160 mg total) by mouth daily.  Dispense: 90 tablet; Refill: 2    If protocol passes may refill for 12 months if within 3 months of last provider visit (or a total of 15 months).

## 2021-06-26 ENCOUNTER — HEALTH MAINTENANCE LETTER (OUTPATIENT)
Age: 71
End: 2021-06-26

## 2021-07-24 DIAGNOSIS — I10 ESSENTIAL HYPERTENSION: ICD-10-CM

## 2021-07-28 RX ORDER — AMLODIPINE BESYLATE 5 MG/1
TABLET ORAL
Qty: 90 TABLET | Refills: 0 | Status: SHIPPED | OUTPATIENT
Start: 2021-07-28 | End: 2021-11-04

## 2021-07-28 NOTE — TELEPHONE ENCOUNTER
"Last Written Prescription Date:  2/4/21  Last Fill Quantity: 90,  # refills: 2   Last office visit provider:  11/16/20     Requested Prescriptions   Pending Prescriptions Disp Refills     amLODIPine (NORVASC) 5 MG tablet [Pharmacy Med Name: AMLODIPINE BESYLATE 5MG TABS] 90 tablet 2     Sig: TAKE 1 TABLET BY MOUTH DAILY       Calcium Channel Blockers Protocol  Passed - 7/24/2021 11:52 AM        Passed - Blood pressure under 140/90 in past 12 months     BP Readings from Last 3 Encounters:   11/16/20 120/78   11/12/19 134/80   10/21/19 132/70                 Passed - Recent (12 mo) or future (30 days) visit within the authorizing provider's specialty     Patient has had an office visit with the authorizing provider or a provider within the authorizing providers department within the previous 12 mos or has a future within next 30 days. See \"Patient Info\" tab in inbasket, or \"Choose Columns\" in Meds & Orders section of the refill encounter.              Passed - Medication is active on med list        Passed - Patient is age 18 or older        Passed - Normal serum creatinine on file in past 12 months     Recent Labs   Lab Test 11/16/20  0900   CR 1.04       Ok to refill medication if creatinine is low               Jefe Hairston RN 07/28/21 7:29 AM  "

## 2021-08-31 ENCOUNTER — TRANSFERRED RECORDS (OUTPATIENT)
Dept: HEALTH INFORMATION MANAGEMENT | Facility: CLINIC | Age: 71
End: 2021-08-31

## 2021-10-11 ENCOUNTER — HEALTH MAINTENANCE LETTER (OUTPATIENT)
Age: 71
End: 2021-10-11

## 2021-10-21 DIAGNOSIS — E78.2 MIXED HYPERLIPIDEMIA: ICD-10-CM

## 2021-10-21 RX ORDER — SIMVASTATIN 20 MG
TABLET ORAL
Qty: 90 TABLET | Refills: 0 | Status: SHIPPED | OUTPATIENT
Start: 2021-10-21 | End: 2022-01-19

## 2021-10-22 NOTE — TELEPHONE ENCOUNTER
"Last Written Prescription Date:  11/16/2020  Last Fill Quantity: 90,  # refills: 3   Last office visit provider:  11/16/2020 with Dr Aldrich.  Pt has upcoming appt 12/31/2021.    Requested Prescriptions   Pending Prescriptions Disp Refills     simvastatin (ZOCOR) 20 MG tablet [Pharmacy Med Name: SIMVASTATIN 20MG TABS] 90 tablet 3     Sig: TAKE ONE TABLET BY MOUTH AT BEDTIME       Statins Protocol Passed - 10/21/2021  8:35 AM        Passed - LDL on file in past 12 months     Recent Labs   Lab Test 11/16/20  0900   LDL 53             Passed - No abnormal creatine kinase in past 12 months     No lab results found.             Passed - Recent (12 mo) or future (30 days) visit within the authorizing provider's specialty     Patient has had an office visit with the authorizing provider or a provider within the authorizing providers department within the previous 12 mos or has a future within next 30 days. See \"Patient Info\" tab in inbasket, or \"Choose Columns\" in Meds & Orders section of the refill encounter.              Passed - Medication is active on med list        Passed - Patient is age 18 or older             Mary Sahni 10/21/21 11:17 PM  "

## 2021-10-29 DIAGNOSIS — M10.00 IDIOPATHIC GOUT, UNSPECIFIED CHRONICITY, UNSPECIFIED SITE: ICD-10-CM

## 2021-10-29 RX ORDER — ALLOPURINOL 300 MG/1
TABLET ORAL
Qty: 90 TABLET | Refills: 3 | Status: SHIPPED | OUTPATIENT
Start: 2021-10-29 | End: 2022-10-25

## 2021-11-15 DIAGNOSIS — E78.5 HYPERLIPEMIA: ICD-10-CM

## 2021-11-16 RX ORDER — FENOFIBRATE 160 MG/1
TABLET ORAL
Qty: 90 TABLET | Refills: 3 | Status: SHIPPED | OUTPATIENT
Start: 2021-11-16 | End: 2022-10-25

## 2021-11-16 NOTE — TELEPHONE ENCOUNTER
"Last Written Prescription Date:  11/16/20  Last Fill Quantity: 90,  # refills: 3   Last office visit provider:  11/16/20     Requested Prescriptions   Pending Prescriptions Disp Refills     fenofibrate (TRIGLIDE/LOFIBRA) 160 MG tablet [Pharmacy Med Name: FENOFIBRATE 160MG TABS] 90 tablet 3     Sig: TAKE ONE TABLET BY MOUTH EVERY DAY       Fibrates Passed - 11/15/2021  9:11 AM        Passed - Lipid panel on file in past 12 months     Recent Labs   Lab Test 11/16/20  0900   CHOL 155   TRIG 164*   HDL 69   LDL 53               Passed - No abnormal creatine kinase in past 12 months     No lab results found.             Passed - Recent (12 mo) or future (30 days) visit within the authorizing provider's specialty     Patient has had an office visit with the authorizing provider or a provider within the authorizing providers department within the previous 12 mos or has a future within next 30 days. See \"Patient Info\" tab in inbasket, or \"Choose Columns\" in Meds & Orders section of the refill encounter.              Passed - Medication is active on med list        Passed - Patient is age 18 or older             Nahomi Ferguson RN 11/16/21 2:45 PM  "

## 2021-11-29 ENCOUNTER — MYC MEDICAL ADVICE (OUTPATIENT)
Dept: INTERNAL MEDICINE | Facility: CLINIC | Age: 71
End: 2021-11-29
Payer: MEDICARE

## 2021-11-29 DIAGNOSIS — F17.201 TOBACCO ABUSE, IN REMISSION: Primary | ICD-10-CM

## 2021-12-03 ENCOUNTER — HOSPITAL ENCOUNTER (OUTPATIENT)
Dept: CT IMAGING | Facility: CLINIC | Age: 71
Discharge: HOME | End: 2021-12-03
Attending: INTERNAL MEDICINE | Admitting: INTERNAL MEDICINE
Payer: MEDICARE

## 2021-12-03 DIAGNOSIS — F17.201 TOBACCO ABUSE, IN REMISSION: ICD-10-CM

## 2021-12-03 PROCEDURE — 71271 CT THORAX LUNG CANCER SCR C-: CPT | Mod: ME

## 2021-12-07 ENCOUNTER — TRANSFERRED RECORDS (OUTPATIENT)
Dept: HEALTH INFORMATION MANAGEMENT | Facility: CLINIC | Age: 71
End: 2021-12-07
Payer: MEDICARE

## 2021-12-27 ASSESSMENT — ACTIVITIES OF DAILY LIVING (ADL): CURRENT_FUNCTION: NO ASSISTANCE NEEDED

## 2021-12-31 ENCOUNTER — OFFICE VISIT (OUTPATIENT)
Dept: INTERNAL MEDICINE | Facility: CLINIC | Age: 71
End: 2021-12-31
Payer: MEDICARE

## 2021-12-31 VITALS
DIASTOLIC BLOOD PRESSURE: 82 MMHG | WEIGHT: 204.4 LBS | BODY MASS INDEX: 28.61 KG/M2 | HEIGHT: 71 IN | OXYGEN SATURATION: 97 % | HEART RATE: 94 BPM | SYSTOLIC BLOOD PRESSURE: 136 MMHG

## 2021-12-31 DIAGNOSIS — G89.29 CHRONIC LEFT SHOULDER PAIN: ICD-10-CM

## 2021-12-31 DIAGNOSIS — M25.512 CHRONIC LEFT SHOULDER PAIN: ICD-10-CM

## 2021-12-31 DIAGNOSIS — F17.201 TOBACCO ABUSE, IN REMISSION: ICD-10-CM

## 2021-12-31 DIAGNOSIS — Z86.0100 PERSONAL HISTORY OF COLONIC POLYPS: ICD-10-CM

## 2021-12-31 DIAGNOSIS — E78.5 HYPERLIPIDEMIA, UNSPECIFIED HYPERLIPIDEMIA TYPE: Chronic | ICD-10-CM

## 2021-12-31 DIAGNOSIS — M1A.9XX0 CHRONIC GOUT WITHOUT TOPHUS, UNSPECIFIED CAUSE, UNSPECIFIED SITE: Chronic | ICD-10-CM

## 2021-12-31 DIAGNOSIS — L72.3 SEBACEOUS CYST: ICD-10-CM

## 2021-12-31 DIAGNOSIS — Z12.5 SCREENING FOR PROSTATE CANCER: ICD-10-CM

## 2021-12-31 DIAGNOSIS — I10 PRIMARY HYPERTENSION: Chronic | ICD-10-CM

## 2021-12-31 DIAGNOSIS — R35.1 NOCTURIA: ICD-10-CM

## 2021-12-31 DIAGNOSIS — Z00.00 ENCOUNTER FOR MEDICARE ANNUAL WELLNESS EXAM: Primary | ICD-10-CM

## 2021-12-31 DIAGNOSIS — N52.9 ERECTILE DYSFUNCTION, UNSPECIFIED ERECTILE DYSFUNCTION TYPE: ICD-10-CM

## 2021-12-31 DIAGNOSIS — K60.30 ANAL FISTULA: ICD-10-CM

## 2021-12-31 DIAGNOSIS — E55.9 VITAMIN D DEFICIENCY: ICD-10-CM

## 2021-12-31 DIAGNOSIS — M81.0 OSTEOPOROSIS WITHOUT CURRENT PATHOLOGICAL FRACTURE, UNSPECIFIED OSTEOPOROSIS TYPE: ICD-10-CM

## 2021-12-31 PROBLEM — M84.352D STRESS FRACTURE OF LEFT FEMUR WITH ROUTINE HEALING: Status: RESOLVED | Noted: 2019-10-21 | Resolved: 2021-12-31

## 2021-12-31 LAB
ALBUMIN SERPL-MCNC: 4.2 G/DL (ref 3.5–5)
ALBUMIN UR-MCNC: NEGATIVE MG/DL
ALP SERPL-CCNC: 68 U/L (ref 45–120)
ALT SERPL W P-5'-P-CCNC: 18 U/L (ref 0–45)
ANION GAP SERPL CALCULATED.3IONS-SCNC: 13 MMOL/L (ref 5–18)
APPEARANCE UR: CLEAR
AST SERPL W P-5'-P-CCNC: 18 U/L (ref 0–40)
BILIRUB SERPL-MCNC: 0.7 MG/DL (ref 0–1)
BILIRUB UR QL STRIP: NEGATIVE
BUN SERPL-MCNC: 20 MG/DL (ref 8–22)
CALCIUM SERPL-MCNC: 10.6 MG/DL (ref 8.5–10.5)
CHLORIDE BLD-SCNC: 107 MMOL/L (ref 98–107)
CHOLEST SERPL-MCNC: 159 MG/DL
CO2 SERPL-SCNC: 20 MMOL/L (ref 22–31)
COLOR UR AUTO: YELLOW
CREAT SERPL-MCNC: 1.17 MG/DL (ref 0.7–1.3)
ERYTHROCYTE [DISTWIDTH] IN BLOOD BY AUTOMATED COUNT: 12.1 % (ref 10–15)
FASTING STATUS PATIENT QL REPORTED: NORMAL
GFR SERPL CREATININE-BSD FRML MDRD: 67 ML/MIN/1.73M2
GLUCOSE BLD-MCNC: 102 MG/DL (ref 70–125)
GLUCOSE UR STRIP-MCNC: NEGATIVE MG/DL
HCT VFR BLD AUTO: 43.5 % (ref 40–53)
HDLC SERPL-MCNC: 71 MG/DL
HGB BLD-MCNC: 14.8 G/DL (ref 13.3–17.7)
HGB UR QL STRIP: NEGATIVE
KETONES UR STRIP-MCNC: NEGATIVE MG/DL
LDLC SERPL CALC-MCNC: 72 MG/DL
LEUKOCYTE ESTERASE UR QL STRIP: NEGATIVE
MCH RBC QN AUTO: 32.2 PG (ref 26.5–33)
MCHC RBC AUTO-ENTMCNC: 34 G/DL (ref 31.5–36.5)
MCV RBC AUTO: 95 FL (ref 78–100)
NITRATE UR QL: NEGATIVE
PH UR STRIP: 6.5 [PH] (ref 5–8)
PLATELET # BLD AUTO: 248 10E3/UL (ref 150–450)
POTASSIUM BLD-SCNC: 4.3 MMOL/L (ref 3.5–5)
PROT SERPL-MCNC: 7.1 G/DL (ref 6–8)
PSA SERPL-MCNC: 0.98 UG/L (ref 0–4.5)
RBC # BLD AUTO: 4.59 10E6/UL (ref 4.4–5.9)
SODIUM SERPL-SCNC: 140 MMOL/L (ref 136–145)
SP GR UR STRIP: 1.02 (ref 1–1.03)
TRIGL SERPL-MCNC: 81 MG/DL
URATE SERPL-MCNC: 3.3 MG/DL (ref 3–8)
UROBILINOGEN UR STRIP-ACNC: 0.2 E.U./DL
WBC # BLD AUTO: 7.8 10E3/UL (ref 4–11)

## 2021-12-31 PROCEDURE — 36415 COLL VENOUS BLD VENIPUNCTURE: CPT | Performed by: INTERNAL MEDICINE

## 2021-12-31 PROCEDURE — G0103 PSA SCREENING: HCPCS | Performed by: INTERNAL MEDICINE

## 2021-12-31 PROCEDURE — 84550 ASSAY OF BLOOD/URIC ACID: CPT | Performed by: INTERNAL MEDICINE

## 2021-12-31 PROCEDURE — 82306 VITAMIN D 25 HYDROXY: CPT | Performed by: INTERNAL MEDICINE

## 2021-12-31 PROCEDURE — 80053 COMPREHEN METABOLIC PANEL: CPT | Performed by: INTERNAL MEDICINE

## 2021-12-31 PROCEDURE — G0439 PPPS, SUBSEQ VISIT: HCPCS | Performed by: INTERNAL MEDICINE

## 2021-12-31 PROCEDURE — 99214 OFFICE O/P EST MOD 30 MIN: CPT | Mod: 25 | Performed by: INTERNAL MEDICINE

## 2021-12-31 PROCEDURE — 80061 LIPID PANEL: CPT | Performed by: INTERNAL MEDICINE

## 2021-12-31 PROCEDURE — 81003 URINALYSIS AUTO W/O SCOPE: CPT | Performed by: INTERNAL MEDICINE

## 2021-12-31 PROCEDURE — 85027 COMPLETE CBC AUTOMATED: CPT | Performed by: INTERNAL MEDICINE

## 2021-12-31 RX ORDER — TADALAFIL 20 MG/1
20 TABLET ORAL DAILY PRN
Qty: 12 TABLET | Refills: 11 | Status: SHIPPED | OUTPATIENT
Start: 2021-12-31 | End: 2022-02-04

## 2021-12-31 ASSESSMENT — ACTIVITIES OF DAILY LIVING (ADL): CURRENT_FUNCTION: NO ASSISTANCE NEEDED

## 2021-12-31 ASSESSMENT — MIFFLIN-ST. JEOR: SCORE: 1713.4
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
## 2021-12-31 NOTE — PROGRESS NOTES
"SUBJECTIVE:   Charles Whipple is a 71 year old male who presents for Preventive Visit.    HPI-in addition to his annual wellness visit, Charles is here to follow-up chronic medical problems including hypertension, gout, erectile dysfunction, and hyperlipidemia.  We also discussed new diagnosis of anal fistula and chronic left shoulder pain.  See assessment plan for details.      Patient has been advised of split billing requirements and indicates understanding: Yes   Are you in the first 12 months of your Medicare coverage?  No    Healthy Habits:     In general, how would you rate your overall health?  Fair    Frequency of exercise:  2-3 days/week    Duration of exercise:  Less than 15 minutes    Do you usually eat at least 4 servings of fruit and vegetables a day, include whole grains    & fiber and avoid regularly eating high fat or \"junk\" foods?  Yes    Taking medications regularly:  Yes    Medication side effects:  None    Ability to successfully perform activities of daily living:  No assistance needed    Home Safety:  No safety concerns identified    Hearing Impairment:  No hearing concerns    In the past 6 months, have you been bothered by leaking of urine?  No    In general, how would you rate your overall mental or emotional health?  Good      PHQ-2 Total Score: 0    Additional concerns today:  No    Do you feel safe in your environment? Yes    Have you ever done Advance Care Planning? (For example, a Health Directive, POLST, or a discussion with a medical provider or your loved ones about your wishes): Yes    Fall risk  Fallen 2 or more times in the past year?: No  Any fall with injury in the past year?: No    Cognitive Screening   1) Repeat 3 items (Leader, Season, Table)    2) Clock draw: NORMAL  3) 3 item recall: Recalls 3 objects  Results: 3 items recalled: COGNITIVE IMPAIRMENT LESS LIKELY    Mini-CogTM Copyright KOSTAS Castañeda. Licensed by the author for use in [facility]; reprinted with " permission (alison@[email or web address]). All rights reserved.          Reviewed and updated as needed this visit by clinical staff  Tobacco  Allergies  Meds             Reviewed and updated as needed this visit by Provider               Social History     Tobacco Use     Smoking status: Former Smoker     Packs/day: 1.00     Years: 40.00     Pack years: 40.00     Quit date: 2015     Years since quittin.3     Smokeless tobacco: Never Used   Substance Use Topics     Alcohol use: Yes     Alcohol/week: 21.0 standard drinks     If you drink alcohol do you typically have >3 drinks per day or >7 drinks per week? Yes        AUDIT - Alcohol Use Disorders Identification Test - Reproduced from the World Health Organization Audit 2001 (Second Edition) 2021   1.  How often do you have a drink containing alcohol? 4 or more times a week   2.  How many drinks containing alcohol do you have on a typical day when you are drinking? 3 or 4   3.  How often do you have five or more drinks on one occasion? Monthly   4.  How often during the last year have you found that you were not able to stop drinking once you had started? Never   5.  How often during the last year have you failed to do what was normally expected of you because of drinking? Never   6.  How often during the last year have you needed a first drink in the morning to get yourself going after a heavy drinking session? Never   7.  How often during the last year have you had a feeling of guilt or remorse after drinking? Never   8.  How often during the last year have you been unable to remember what happened the night before because of your drinking? Never   9.  Have you or someone else been injured because of your drinking? No   10. Has a relative, friend, doctor or other health care worker been concerned about your drinking or suggested you cut down? No   TOTAL SCORE 7         Current providers sharing in care for this patient include:   Patient Care Team:  Elinor  "Kalpesh Noel MD as PCP - General (Internal Medicine)  Kalpesh Aldrich MD as Assigned PCP    The following health maintenance items are reviewed in Epic and correct as of today:  Health Maintenance Due   Topic Date Due     ANNUAL REVIEW OF HM ORDERS  Never done     FALL RISK ASSESSMENT  11/16/2021     Lab work is in process          Review of Systems  12 point review of systems is negative other than what is discussed in the assessment and plan    OBJECTIVE:   /82 (BP Location: Right arm, Patient Position: Sitting, Cuff Size: Adult Large)   Pulse 94   Ht 1.802 m (5' 10.95\")   Wt 92.7 kg (204 lb 6.4 oz)   SpO2 97%   BMI 28.55 kg/m   Estimated body mass index is 28.55 kg/m  as calculated from the following:    Height as of this encounter: 1.802 m (5' 10.95\").    Weight as of this encounter: 92.7 kg (204 lb 6.4 oz).  Physical Exam  EYES: Eyelids, conjunctiva, and sclera were normal. Pupils were normal. Cornea, iris, and lens were normal bilaterally.  HEAD, EARS, NOSE, MOUTH, AND THROAT: Head and face were normal. TMs and external auditory canals are normal  NECK: Neck appearance was normal. There were no neck masses and the thyroid was not enlarged and no nodules are felt.  No lymphadenopathy.  RESPIRATORY: Breathing pattern was normal and the chest moved symmetrically.   Lung sounds were normal and there were no rales or wheezes.  CARDIOVASCULAR: Heart rate and rhythm were normal.  S1 and S2 were normal and there were no extra sounds or murmurs. Peripheral pulses in arms and legs were normal.  Jugular venous pressure was normal.  There was no peripheral edema.  No carotid bruits.  GASTROINTESTINAL:  Bowel sounds were present.   Palpation detected no tenderness, mass, or enlarged organs.   RECTAL/PROSTATE: Deferred  MUSCULOSKELETAL: Pain in left shoulder with abduction and rotation.  LYMPHATIC: There were no enlarged nodes.  SKIN/HAIR/NAILS: Skin color was normal.  Hair and nails were normal.benign " sebaceous cyst on upper back.  There were no skin lesions.  NEUROLOGIC: The patient was alert and oriented to person, place, time, and circumstance. Speech was normal. Cranial nerves were normal. Motor strength was normal for age. The patient was normally coordinated.    PSYCHIATRIC:  Mood and affect were normal and the patient had normal recent and remote memory. The patient's judgment and insight were normal.        ASSESSMENT / PLAN:   1. Encounter for Medicare annual wellness exam  Immunizations are reviewed and he will need his tetanus updated before August 2022.  He has a living will.  Former smoker.  Discussed using alcohol in moderation.  Regular exercise discussed.  Up to date with colonoscopies and this should be repeated in 2023.  Prostate exam is deferred but I will check a PSA for prostate cancer screening.  Dementia and depression screening completed.  He sees his ophthalmologist every year. Skin exam performed and recommending regular use of sunblock.  He sees a dermatologist every year.  Hepatitis C antibody for screening was normal.  Will screen for diabetes with fasting glucose.  No AAA on CT in 2015.  Consider ultrasound at age 72.    2. Primary hypertension  Good blood pressure control with amlodipine and losartan  - CBC with platelets; Future  - Comprehensive metabolic panel (BMP + Alb, Alk Phos, ALT, AST, Total. Bili, TP); Future  - UA Macro with Reflex to Micro and Culture - lab collect; Future    3. Chronic left shoulder pain  Chronic pain involving left shoulder with abnormal exam.  Minimal response to steroid injection or to physical therapy.  Suspect rotator cuff tear and I am recommending that he follow-up with orthopedics.    4. Anal fistula  Diagnosed with anal fistula and is making arrangements for further evaluation with Dr. Maxwell    5. Chronic gout without tophus, unspecified cause, unspecified site  Gout is well controlled taking allopurinol  - Uric acid; Future    6. Tobacco abuse,  "in remission  Quit smoking in 2015.  Continue annual low-dose CT scan which was completed this past month showing no new nodules    7. Nocturia  We discussed cutting back on alcohol intake at night.  He is not interested in taking medication    8. Osteoporosis without current pathological fracture, unspecified osteoporosis type  DEXA with T score -2.0 following distal femur fracture.  Should still be considered to have osteoporosis.  He has declined treatment with bisphosphonates.  Recheck vitamin D level.    9. Vitamin D deficiency  Recheck vitamin D level  - Vitamin D deficiency screening; Future    10. Hyperlipidemia, unspecified hyperlipidemia type  Recheck lipid profile taking simvastatin  - Lipid Profile (Chol, Trig, HDL, LDL calc); Future    11. Personal history of colonic polyps  Next colonoscopy will be due in 2023    12. Erectile dysfunction, unspecified erectile dysfunction type  Viagra was ineffective.  Will try Cialis 20 mg daily as needed  - tadalafil (CIALIS) 20 MG tablet; Take 1 tablet (20 mg) by mouth daily as needed (Erectile dysfunction)  Dispense: 12 tablet; Refill: 11    13. Screening for prostate cancer  Declines prostate exam  - PSA, screen; Future    14. Sebaceous cyst  Reassurance regarding small benign sebaceous cyst on upper back.  Asymptomatic    Patient has been advised of split billing requirements and indicates understanding: Yes  COUNSELING:  Reviewed preventive health counseling, as reflected in patient instructions       Consider AAA screening for ages 65-75 and smoking history       Regular exercise       Healthy diet/nutrition       Vision screening       Dental care       Alcohol Use        Consider lung cancer screening for ages 55-80 years and 30 pack-year smoking history        Colon cancer screening       Prostate cancer screening    Estimated body mass index is 28.55 kg/m  as calculated from the following:    Height as of this encounter: 1.802 m (5' 10.95\").    Weight as of " this encounter: 92.7 kg (204 lb 6.4 oz).        He reports that he quit smoking about 6 years ago. He has a 40.00 pack-year smoking history. He has never used smokeless tobacco.      Appropriate preventive services were discussed with this patient, including applicable screening as appropriate for cardiovascular disease, diabetes, osteopenia/osteoporosis, and glaucoma.  As appropriate for age/gender, discussed screening for colorectal cancer, prostate cancer, breast cancer, and cervical cancer. Checklist reviewing preventive services available has been given to the patient.    Reviewed patients plan of care and provided an AVS. The Basic Care Plan (routine screening as documented in Health Maintenance) for Charles meets the Care Plan requirement. This Care Plan has been established and reviewed with the Patient.    Counseling Resources:  ATP IV Guidelines  Pooled Cohorts Equation Calculator  Breast Cancer Risk Calculator  Breast Cancer: Medication to Reduce Risk  FRAX Risk Assessment  ICSI Preventive Guidelines  Dietary Guidelines for Americans, 2010  [employer]'s MyPlate  ASA Prophylaxis  Lung CA Screening    Kalpesh Aldrich MD  [facility]    Identified Health Risks:      The patient was provided with suggestions to help him develop a healthy physical lifestyle.  He is at risk for lack of exercise and has been provided with information to increase physical activity for the benefit of his well-being.  The patient reports that he drinks more than one alcoholic drink per day but denies binge or excessive drinking. He was counseled and given information about possible harmful effects of excessive alcohol intake.

## 2021-12-31 NOTE — PROGRESS NOTES
The patient was provided with suggestions to help him develop a healthy physical lifestyle.  He is at risk for lack of exercise and has been provided with information to increase physical activity for the benefit of his well-being.  The patient reports that he drinks more than one alcoholic drink per day but denies binge or excessive drinking. He was counseled and given information about possible harmful effects of excessive alcohol intake.

## 2022-01-12 ENCOUNTER — TRANSFERRED RECORDS (OUTPATIENT)
Dept: HEALTH INFORMATION MANAGEMENT | Facility: CLINIC | Age: 72
End: 2022-01-12
Payer: MEDICARE

## 2022-01-17 ENCOUNTER — TRANSFERRED RECORDS (OUTPATIENT)
Dept: HEALTH INFORMATION MANAGEMENT | Facility: CLINIC | Age: 72
End: 2022-01-17
Payer: MEDICARE

## 2022-01-17 DIAGNOSIS — E78.2 MIXED HYPERLIPIDEMIA: ICD-10-CM

## 2022-01-19 ENCOUNTER — MYC REFILL (OUTPATIENT)
Dept: INTERNAL MEDICINE | Facility: CLINIC | Age: 72
End: 2022-01-19
Payer: MEDICARE

## 2022-01-19 DIAGNOSIS — I10 ESSENTIAL HYPERTENSION: ICD-10-CM

## 2022-01-19 DIAGNOSIS — E78.2 MIXED HYPERLIPIDEMIA: ICD-10-CM

## 2022-01-19 RX ORDER — LOSARTAN POTASSIUM 100 MG/1
TABLET ORAL
Qty: 30 TABLET | Refills: 0 | Status: SHIPPED | OUTPATIENT
Start: 2022-01-19 | End: 2022-02-19

## 2022-01-19 RX ORDER — SIMVASTATIN 20 MG
TABLET ORAL
Qty: 90 TABLET | Refills: 0 | Status: SHIPPED | OUTPATIENT
Start: 2022-01-19 | End: 2022-02-19

## 2022-01-21 RX ORDER — LOSARTAN POTASSIUM 100 MG/1
100 TABLET ORAL DAILY
Qty: 30 TABLET | Refills: 0 | OUTPATIENT
Start: 2022-01-21

## 2022-01-21 RX ORDER — SIMVASTATIN 20 MG
20 TABLET ORAL
Qty: 90 TABLET | Refills: 0 | OUTPATIENT
Start: 2022-01-21

## 2022-02-04 ENCOUNTER — OFFICE VISIT (OUTPATIENT)
Dept: FAMILY MEDICINE | Facility: CLINIC | Age: 72
End: 2022-02-04
Payer: MEDICARE

## 2022-02-04 VITALS
HEART RATE: 94 BPM | DIASTOLIC BLOOD PRESSURE: 83 MMHG | OXYGEN SATURATION: 98 % | BODY MASS INDEX: 28.71 KG/M2 | WEIGHT: 205.1 LBS | SYSTOLIC BLOOD PRESSURE: 130 MMHG | RESPIRATION RATE: 20 BRPM | HEIGHT: 71 IN

## 2022-02-04 DIAGNOSIS — I10 PRIMARY HYPERTENSION: Chronic | ICD-10-CM

## 2022-02-04 DIAGNOSIS — Z01.818 PREOP EXAMINATION: Primary | ICD-10-CM

## 2022-02-04 DIAGNOSIS — N52.9 ERECTILE DYSFUNCTION, UNSPECIFIED ERECTILE DYSFUNCTION TYPE: ICD-10-CM

## 2022-02-04 DIAGNOSIS — K60.30 ANAL FISTULA: ICD-10-CM

## 2022-02-04 LAB
ANION GAP SERPL CALCULATED.3IONS-SCNC: 11 MMOL/L (ref 5–18)
BUN SERPL-MCNC: 23 MG/DL (ref 8–22)
CALCIUM SERPL-MCNC: 11.3 MG/DL (ref 8.5–10.5)
CHLORIDE BLD-SCNC: 106 MMOL/L (ref 98–107)
CO2 SERPL-SCNC: 23 MMOL/L (ref 22–31)
CREAT SERPL-MCNC: 1.1 MG/DL (ref 0.7–1.3)
GFR SERPL CREATININE-BSD FRML MDRD: 73 ML/MIN/1.73M2
GLUCOSE BLD-MCNC: 102 MG/DL (ref 70–125)
POTASSIUM BLD-SCNC: 4.1 MMOL/L (ref 3.5–5)
SODIUM SERPL-SCNC: 140 MMOL/L (ref 136–145)

## 2022-02-04 PROCEDURE — 36415 COLL VENOUS BLD VENIPUNCTURE: CPT | Performed by: FAMILY MEDICINE

## 2022-02-04 PROCEDURE — 99214 OFFICE O/P EST MOD 30 MIN: CPT | Performed by: FAMILY MEDICINE

## 2022-02-04 PROCEDURE — 80048 BASIC METABOLIC PNL TOTAL CA: CPT | Performed by: FAMILY MEDICINE

## 2022-02-04 ASSESSMENT — MIFFLIN-ST. JEOR: SCORE: 1709.52

## 2022-02-04 NOTE — LETTER
February 7, 2022      Charles Whipple  [address] [place] [address]        Dear ,    We are writing to inform you of your test results.  The kidney tests are otherwise normal, with the exception that the calcium level is slightly high.  This may or may not be of any significance but I would like you to follow-up with your primary in the next month or so for this to be followed up on.      Resulted Orders   Basic metabolic panel   Result Value Ref Range    Sodium 140 136 - 145 mmol/L    Potassium 4.1 3.5 - 5.0 mmol/L    Chloride 106 98 - 107 mmol/L    Carbon Dioxide (CO2) 23 22 - 31 mmol/L    Anion Gap 11 5 - 18 mmol/L    Urea Nitrogen 23 (H) 8 - 22 mg/dL    Creatinine 1.10 0.70 - 1.30 mg/dL    Calcium 11.3 (H) 8.5 - 10.5 mg/dL    Glucose 102 70 - 125 mg/dL    GFR Estimate 73 >60 mL/min/1.73m2      Comment:      Effective December 21, 2021 eGFRcr in adults is calculated using the 2021 CKD-EPI creatinine equation which includes age and gender ( et al., NEJM, DOI: 10.1056/IZAWlk1739489)       If you have any questions or concerns, please call the clinic at the number listed above.       Sincerely,      Francisco Thomas MD

## 2022-02-04 NOTE — PROGRESS NOTES
[address] [address]  Phone: [phone]  Fax: [phone]  Primary Provider: Kalpesh Aldrich  Pre-op Performing Provider: MELISSA ALEXANDER       PREOPERATIVE EVALUATION:  Today's date: 2/4/2022    Charles Whipple is a 71 year old male who presents for a preoperative evaluation.    Surgical Information:  Surgery/Procedure:       Anorectal surgery   Surgery Location: [facility]    Surgeon: Jamil Maxwell MD  Surgery Date: 2/9/2022  Time of Surgery: 10:30 am  Where patient plans to recover: At home with family   Fax number for surgical facility: [phone]    Type of Anesthesia Anticipated: General    Assessment & Plan     The proposed surgical procedure is considered LOW risk.    Preop examination  Surgical correction of an anal fistula  - Basic metabolic panel    Primary hypertension  Well-controlled  - Basic metabolic panel    Anal fistula  Present for 1 year  - Basic metabolic panel    PLAN:  1.  Basic metabolic profile reviewed, results essentially normal with this slight elevation of calcium.  2.  Patient had a CBC on December 31, results completely normal  3.  Patient is otherwise cleared for surgery  4.  I recommend the patient follow-up with his primary though in about a month or so for the elevated calcium level.        Risks and Recommendations:  The patient has the following additional risks and recommendations for perioperative complications:   - No identified additional risk factors other than previously addressed    Medication Instructions:  Patient is to take all scheduled medications on the day of surgery    RECOMMENDATION:  APPROVAL GIVEN to proceed with proposed procedure, without further diagnostic evaluation.   [id number]}    Subjective     HPI related to upcoming procedure: Who comes in for preoperative clearance prior to surgery for an anal fistula.  For about a year the patient has had some anal leakage and  discomfort, he has tried sitz bath's and other interventions thus far without success and is scheduled for upcoming exploratory surgery to see if this can be corrected surgically.    Patient has a history of underlying hypertension well controlled.  There has otherwise been no other change in his health status he is feeling well he has done well with his most recent surgery.      Preop Questions 2/4/2022   1. Have you ever had a heart attack or stroke? No   2. Have you ever had surgery on your heart or blood vessels, such as a stent placement, a coronary artery bypass, or surgery on an artery in your head, neck, heart, or legs? No   3. Do you have chest pain with activity? No   4. Do you have a history of  heart failure? No   5. Do you currently have a cold, bronchitis or symptoms of other infection? No   6. Do you have a cough, shortness of breath, or wheezing? No   7. Do you or anyone in your family have previous history of blood clots? No   8. Do you or does anyone in your family have a serious bleeding problem such as prolonged bleeding following surgeries or cuts? No   9. Have you ever had problems with anemia or been told to take iron pills? No   10. Have you had any abnormal blood loss such as black, tarry or bloody stools? No   11. Have you ever had a blood transfusion? UNKNOWN -     12. Are you willing to have a blood transfusion if it is medically needed before, during, or after your surgery? Yes   13. Have you or any of your relatives ever had problems with anesthesia? No   14. Do you have sleep apnea, excessive snoring or daytime drowsiness? No   15. Do you have any artifical heart valves or other implanted medical devices like a pacemaker, defibrillator, or continuous glucose monitor? No   16. Do you have artificial joints? No   17. Are you allergic to latex? No       Health Care Directive:  Patient does not have a Health Care Directive or Living Will: Advance Directive received and scanned. Click on  Code in the patient header to view.    Preoperative Review of :   reviewed - no record of controlled substances prescribed.       Status of Chronic Conditions:  See problem list for active medical problems.  Problems all longstanding and stable, except as noted/documented.  See ROS for pertinent symptoms related to these conditions.      Review of Systems  CONSTITUTIONAL: NEGATIVE for fever, chills, change in weight  INTEGUMENTARY/SKIN: NEGATIVE for worrisome rashes, moles or lesions  EYES: NEGATIVE for vision changes or irritation  ENT/MOUTH: NEGATIVE for ear, mouth and throat problems  RESP: NEGATIVE for significant cough or SOB  CV: NEGATIVE for chest pain, palpitations or peripheral edema  GI: NEGATIVE for nausea, abdominal pain, heartburn, or change in bowel habits  : NEGATIVE for frequency, dysuria, or hematuria  MUSCULOSKELETAL: NEGATIVE for significant arthralgias or myalgia  NEURO: NEGATIVE for weakness, dizziness or paresthesias  ENDOCRINE: NEGATIVE for temperature intolerance, skin/hair changes  HEME: NEGATIVE for bleeding problems  PSYCHIATRIC: NEGATIVE for changes in mood or affect    Patient Active Problem List    Diagnosis Date Noted     Chronic left shoulder pain 12/31/2021     Priority: Medium     Erectile dysfunction, unspecified erectile dysfunction type 12/31/2021     Priority: Medium     Sebaceous cyst 12/31/2021     Priority: Medium     Anal fistula 01/01/2021     Priority: Medium     Nocturia 11/16/2020     Priority: Medium     Osteoporosis without current pathological fracture      Priority: Medium     DEXA T score -2.0 but for trabecular bone score and recent recurrent   stress fracture of distal femur, declines treatment with alendronate         Vitamin D deficiency      Priority: Medium     Right groin pain 10/21/2019     Priority: Medium     Personal history of colonic polyps      Priority: Medium     Colonoscopy December 2018 without polyps         Tobacco abuse, in remission  10/13/2016     Priority: Medium     Benign pulmonary nodule resected , CT chest normal 2016, CT 2019 without pulmonary nodules, no nodules 2020.  CT without nodules 2021       Hyperlipidemia      Priority: Medium     Gout      Priority: Medium     Hypertension      Priority: Medium     Pulmonary nodule 2015     Priority: Medium     Benign surgical resection 9/15, CT chest normal 2016, continue   annually          Past Medical History:   Diagnosis Date     Acute pain of left knee 2019    Gout versus tendinitis     Chronic left shoulder pain 2021     Gout      Hyperlipidemia      Hypertension      Impaired fasting glucose 10/13/2016    Glucose 113 2016 hemoglobin A1c normal at 5.5%     Impetigo 10/23/2018    Staph infection associated with laceration underneath right nose     Left elbow pain 10/17/2017     Lyme disease 2011     Nocturia 2020     Osteoporosis without current pathological fracture     DEXA T score -2.0 but for trabecular bone score and recent recurrent stress fracture of distal femur, declines treatment with alendronate     Personal history of colonic polyps     Colonoscopy 2018 without polyps     Pulmonary nodule 2015    Benign surgical resection 9/15, CT chest normal 2016, continue annually     Reactive depression (situational) 10/13/2016    Brother  suddenly last month after complications from hip fracture     Risk of exposure to Lyme disease 10/21/2019     Screening for abdominal aortic aneurysm     CT scan normal  at age 65.  Consider ultrasound at age 72     Sebaceous cyst 2021     Small bowel obstruction (H) 2015     Stress fracture of left femur with routine healing 10/21/2019     Tobacco abuse, in remission 10/13/2016    Benign pulmonary nodule resected , CT chest normal 2016, CT 2019 without pulmonary nodules, no nodules 2020.  CT without  nodules 2021     Vitamin D deficiency      Past Surgical History:   Procedure Laterality Date     OTHER SURGICAL HISTORY Right 9/15    lung nodule resectionBenign pulmonary nodule     Current Outpatient Medications   Medication Sig Dispense Refill     losartan (COZAAR) 100 MG tablet TAKE ONE TABLET BY MOUTH EVERY DAY 30 tablet 0     allopurinol (ZYLOPRIM) 300 MG tablet TAKE ONE TABLET BY MOUTH EVERY DAY 90 tablet 3     amLODIPine (NORVASC) 5 MG tablet Take 1 tablet (5 mg) by mouth daily 90 tablet 3     aspirin 81 mg chewable tablet [ASPIRIN 81 MG CHEWABLE TABLET] Chew 81 mg daily.       cholecalciferol, vitamin D3, 5,000 unit capsule [CHOLECALCIFEROL, VITAMIN D3, 5,000 UNIT CAPSULE] Take 1 capsule (5,000 Units total) by mouth daily.  0     fenofibrate (TRIGLIDE/LOFIBRA) 160 MG tablet TAKE ONE TABLET BY MOUTH EVERY DAY 90 tablet 3     omega-3/dha/epa/fish oil (FISH OIL-OMEGA-3 FATTY ACIDS) 300-1,000 mg capsule [OMEGA-3/DHA/EPA/FISH OIL (FISH OIL-OMEGA-3 FATTY ACIDS) 300-1,000 MG CAPSULE] Take 2 g by mouth daily.       simvastatin (ZOCOR) 20 MG tablet TAKE ONE TABLET BY MOUTH AT BEDTIME 90 tablet 0     tadalafil (CIALIS) 20 MG tablet Take 1 tablet (20 mg) by mouth daily as needed (Erectile dysfunction) 12 tablet 11       Allergies   Allergen Reactions     Pentothal [Thiopental] Nausea and Vomiting     May have infused to rapidly.     Thiopental Nausea and Vomiting        Social History     Tobacco Use     Smoking status: Former Smoker     Packs/day: 1.00     Years: 40.00     Pack years: 40.00     Quit date: 2015     Years since quittin.4     Smokeless tobacco: Never Used   Substance Use Topics     Alcohol use: Yes     Alcohol/week: 21.0 standard drinks     Comment: 3-4/day     Family History   Problem Relation Age of Onset     Lung Cancer Mother      Heart Disease Father      Prostate Cancer Father      Lung Cancer Sister      No Known Problems Brother         aspiration after hip fx     Breast Cancer  Sister      History   Drug Use No         Objective     There were no vitals taken for this visit.    Physical Exam    GENERAL APPEARANCE: healthy, alert and no distress     EYES: EOMI,  PERRL     HENT: ear canals and TM's normal and nose and mouth without ulcers or lesions     NECK: no adenopathy, no asymmetry, masses, or scars and thyroid normal to palpation     RESP: lungs clear to auscultation - no rales, rhonchi or wheezes     CV: regular rates and rhythm, normal S1 S2, no S3 or S4 and no murmur, click or rub     ABDOMEN:  soft, nontender, no HSM or masses and bowel sounds normal     MS: extremities normal- no gross deformities noted, no evidence of inflammation in joints, FROM in all extremities.     SKIN: no suspicious lesions or rashes     NEURO: Normal strength and tone, sensory exam grossly normal, mentation intact and speech normal     PSYCH: mentation appears normal. and affect normal/bright     LYMPHATICS: No cervical adenopathy    Recent Labs   Lab Test 12/31/21  1040 11/16/20  0900   HGB 14.8 15.4    244    140   POTASSIUM 4.3 4.2   CR 1.17 1.04        Diagnostics:  Recent Results (from the past 168 hour(s))   Basic metabolic panel    Collection Time: 02/04/22  1:55 PM   Result Value Ref Range    Sodium 140 136 - 145 mmol/L    Potassium 4.1 3.5 - 5.0 mmol/L    Chloride 106 98 - 107 mmol/L    Carbon Dioxide (CO2) 23 22 - 31 mmol/L    Anion Gap 11 5 - 18 mmol/L    Urea Nitrogen 23 (H) 8 - 22 mg/dL    Creatinine 1.10 0.70 - 1.30 mg/dL    Calcium 11.3 (H) 8.5 - 10.5 mg/dL    Glucose 102 70 - 125 mg/dL    GFR Estimate 73 >60 mL/min/1.73m2      No EKG required for low risk surgery (cataract, skin procedure, breast biopsy, etc).    Revised Cardiac Risk Index (RCRI):  The patient has the following serious cardiovascular risks for perioperative complications:   - No serious cardiac risks = 0 points     RCRI Interpretation: 0 points: Class I (very low risk - 0.4% complication rate)           Signed  Electronically by: Francisco Thomas MD  Copy of this evaluation report is provided to requesting physician.

## 2022-03-02 ENCOUNTER — TRANSFERRED RECORDS (OUTPATIENT)
Dept: HEALTH INFORMATION MANAGEMENT | Facility: CLINIC | Age: 72
End: 2022-03-02
Payer: MEDICARE

## 2022-03-07 ENCOUNTER — TRANSFERRED RECORDS (OUTPATIENT)
Dept: HEALTH INFORMATION MANAGEMENT | Facility: CLINIC | Age: 72
End: 2022-03-07
Payer: MEDICARE

## 2022-04-19 DIAGNOSIS — E78.2 MIXED HYPERLIPIDEMIA: ICD-10-CM

## 2022-04-21 RX ORDER — SIMVASTATIN 20 MG
TABLET ORAL
Qty: 90 TABLET | Refills: 0 | OUTPATIENT
Start: 2022-04-21

## 2022-04-21 NOTE — TELEPHONE ENCOUNTER
Refused refill request as medication has been discontinued.   Discontinued 2/21/2022  Miriam Jeffries RN   04/21/22 10:52 AM  [facility] Nurse Advisor

## 2022-04-26 NOTE — TELEPHONE ENCOUNTER
Patient called looking for his refill, told the patient medication was discontinued patient was not aware of this and is looking for clarification. Please advise patient is out of this medication and is looking for clarification and a possible refill as soon as possible.

## 2022-04-28 DIAGNOSIS — E83.52 HYPERCALCEMIA: Primary | ICD-10-CM

## 2022-04-28 DIAGNOSIS — E78.2 MIXED HYPERLIPIDEMIA: ICD-10-CM

## 2022-04-28 RX ORDER — SIMVASTATIN 20 MG
20 TABLET ORAL DAILY
Qty: 90 TABLET | Refills: 3 | Status: SHIPPED | OUTPATIENT
Start: 2022-04-28 | End: 2023-04-23

## 2022-05-02 ENCOUNTER — LAB (OUTPATIENT)
Dept: LAB | Facility: CLINIC | Age: 72
End: 2022-05-02
Payer: MEDICARE

## 2022-05-02 DIAGNOSIS — E83.52 HYPERCALCEMIA: ICD-10-CM

## 2022-05-02 LAB
CALCIUM SERPL-MCNC: 11.2 MG/DL (ref 8.5–10.5)
PTH-INTACT SERPL-MCNC: 47 PG/ML (ref 10–86)

## 2022-05-02 PROCEDURE — 82310 ASSAY OF CALCIUM: CPT

## 2022-05-02 PROCEDURE — 83970 ASSAY OF PARATHORMONE: CPT

## 2022-05-02 PROCEDURE — 36415 COLL VENOUS BLD VENIPUNCTURE: CPT

## 2022-05-05 DIAGNOSIS — E55.9 VITAMIN D DEFICIENCY: Primary | ICD-10-CM

## 2022-05-05 RX ORDER — CHOLECALCIFEROL (VITAMIN D3) 50 MCG
1 TABLET ORAL DAILY
COMMUNITY
Start: 2022-05-05

## 2022-05-06 DIAGNOSIS — L72.3 SEBACEOUS CYST: Primary | ICD-10-CM

## 2022-05-31 ENCOUNTER — TRANSFERRED RECORDS (OUTPATIENT)
Dept: HEALTH INFORMATION MANAGEMENT | Facility: CLINIC | Age: 72
End: 2022-05-31
Payer: MEDICARE

## 2022-06-09 ENCOUNTER — TRANSFERRED RECORDS (OUTPATIENT)
Dept: HEALTH INFORMATION MANAGEMENT | Facility: CLINIC | Age: 72
End: 2022-06-09
Payer: MEDICARE

## 2022-07-15 ENCOUNTER — TELEPHONE (OUTPATIENT)
Dept: INTERNAL MEDICINE | Facility: CLINIC | Age: 72
End: 2022-07-15

## 2022-07-15 ENCOUNTER — TRANSFERRED RECORDS (OUTPATIENT)
Dept: HEALTH INFORMATION MANAGEMENT | Facility: CLINIC | Age: 72
End: 2022-07-15

## 2022-07-15 ENCOUNTER — MEDICAL CORRESPONDENCE (OUTPATIENT)
Dept: HEALTH INFORMATION MANAGEMENT | Facility: CLINIC | Age: 72
End: 2022-07-15

## 2022-07-15 NOTE — TELEPHONE ENCOUNTER
Patient does not need appointment as he had this completed today.  Susana Ho CMA ............... 5:08 PM, 07/15/22

## 2022-07-15 NOTE — TELEPHONE ENCOUNTER
7-15-22  Joyce called from [facility] and stated pt needs a pre-op:  Surgery date :7-19-22  DX: K62.0  Location: [place]  DR: Dr aline Maxwell  Per [facility] no openings within the [place] region at any location, please call pt on where dr landrum wants to fit pt in the schedule.  ayaka

## 2022-07-15 NOTE — TELEPHONE ENCOUNTER
I have no availability on July 18.  He will need to schedule his preop with an available provider.

## 2022-07-19 ENCOUNTER — TRANSFERRED RECORDS (OUTPATIENT)
Dept: HEALTH INFORMATION MANAGEMENT | Facility: CLINIC | Age: 72
End: 2022-07-19

## 2022-08-09 ENCOUNTER — TRANSFERRED RECORDS (OUTPATIENT)
Dept: HEALTH INFORMATION MANAGEMENT | Facility: CLINIC | Age: 72
End: 2022-08-09

## 2022-08-31 ENCOUNTER — TRANSFERRED RECORDS (OUTPATIENT)
Dept: HEALTH INFORMATION MANAGEMENT | Facility: CLINIC | Age: 72
End: 2022-08-31

## 2022-09-13 ENCOUNTER — TRANSFERRED RECORDS (OUTPATIENT)
Dept: HEALTH INFORMATION MANAGEMENT | Facility: CLINIC | Age: 72
End: 2022-09-13

## 2022-09-25 ENCOUNTER — HEALTH MAINTENANCE LETTER (OUTPATIENT)
Age: 72
End: 2022-09-25

## 2022-10-25 ENCOUNTER — MYC MEDICAL ADVICE (OUTPATIENT)
Dept: INTERNAL MEDICINE | Facility: CLINIC | Age: 72
End: 2022-10-25

## 2022-10-25 DIAGNOSIS — I10 ESSENTIAL HYPERTENSION: ICD-10-CM

## 2022-10-25 DIAGNOSIS — M10.00 IDIOPATHIC GOUT, UNSPECIFIED CHRONICITY, UNSPECIFIED SITE: ICD-10-CM

## 2022-10-25 DIAGNOSIS — E78.5 HYPERLIPEMIA: ICD-10-CM

## 2022-10-25 RX ORDER — FENOFIBRATE 160 MG/1
160 TABLET ORAL DAILY
Qty: 90 TABLET | Refills: 0 | Status: SHIPPED | OUTPATIENT
Start: 2022-10-25 | End: 2023-01-23

## 2022-10-25 RX ORDER — AMLODIPINE BESYLATE 5 MG/1
5 TABLET ORAL DAILY
Qty: 90 TABLET | Refills: 0 | Status: SHIPPED | OUTPATIENT
Start: 2022-10-25 | End: 2023-01-23

## 2022-10-25 RX ORDER — ALLOPURINOL 300 MG/1
1 TABLET ORAL DAILY
Qty: 90 TABLET | Refills: 0 | Status: SHIPPED | OUTPATIENT
Start: 2022-10-25 | End: 2023-01-07

## 2022-10-25 NOTE — TELEPHONE ENCOUNTER
Approved per [facility] RN refill protocol.    Appointments in Next Year    Jan 03, 2023  9:20 AM  (Arrive by 9:00 AM)  Annual Wellness Visit with Kalpesh Aldrich MD  [facility] ([facility] ) [phone]        Cassia BELTRAN, RN  [facility]

## 2022-10-26 RX ORDER — FENOFIBRATE 160 MG/1
TABLET ORAL
Qty: 90 TABLET | Refills: 3 | OUTPATIENT
Start: 2022-10-26

## 2022-10-27 DIAGNOSIS — I10 ESSENTIAL HYPERTENSION: ICD-10-CM

## 2022-10-27 RX ORDER — LOSARTAN POTASSIUM 100 MG/1
100 TABLET ORAL DAILY
Qty: 90 TABLET | Refills: 0 | Status: SHIPPED | OUTPATIENT
Start: 2022-10-27 | End: 2023-01-23

## 2022-10-27 RX ORDER — LOSARTAN POTASSIUM 100 MG/1
100 TABLET ORAL DAILY
Qty: 90 TABLET | Refills: 2 | Status: CANCELLED | OUTPATIENT
Start: 2022-10-27

## 2022-11-01 ENCOUNTER — TRANSFERRED RECORDS (OUTPATIENT)
Dept: HEALTH INFORMATION MANAGEMENT | Facility: CLINIC | Age: 72
End: 2022-11-01

## 2022-11-03 ENCOUNTER — MYC MEDICAL ADVICE (OUTPATIENT)
Dept: INTERNAL MEDICINE | Facility: CLINIC | Age: 72
End: 2022-11-03

## 2022-11-03 DIAGNOSIS — F17.201 TOBACCO ABUSE, IN REMISSION: Primary | ICD-10-CM

## 2022-11-03 DIAGNOSIS — Z87.891 PERSONAL HISTORY OF TOBACCO USE: ICD-10-CM

## 2022-11-03 NOTE — TELEPHONE ENCOUNTER
Dr Aldrich, please advise on patient's request. Order pended; please advise if appropriate.    DEVIN Matthew, RN  [facility]

## 2022-12-05 ENCOUNTER — HOSPITAL ENCOUNTER (OUTPATIENT)
Dept: CT IMAGING | Facility: CLINIC | Age: 72
Discharge: HOME | End: 2022-12-05
Attending: INTERNAL MEDICINE | Admitting: INTERNAL MEDICINE
Payer: MEDICARE

## 2022-12-05 DIAGNOSIS — Z87.891 PERSONAL HISTORY OF TOBACCO USE: ICD-10-CM

## 2022-12-05 DIAGNOSIS — F17.201 TOBACCO ABUSE, IN REMISSION: ICD-10-CM

## 2022-12-05 PROCEDURE — 71271 CT THORAX LUNG CANCER SCR C-: CPT

## 2022-12-30 ASSESSMENT — ENCOUNTER SYMPTOMS
PARESTHESIAS: 0
EYE PAIN: 0
SORE THROAT: 0
WEAKNESS: 0
HEMATURIA: 0
FEVER: 0
MYALGIAS: 0
COUGH: 0
CHILLS: 0
NAUSEA: 0
SHORTNESS OF BREATH: 0
DIZZINESS: 0
JOINT SWELLING: 0
HEMATOCHEZIA: 0
ABDOMINAL PAIN: 0
ARTHRALGIAS: 0
DIARRHEA: 0
PALPITATIONS: 0
HEARTBURN: 0
HEADACHES: 0
CONSTIPATION: 0
NERVOUS/ANXIOUS: 0
FREQUENCY: 1
DYSURIA: 0

## 2022-12-30 ASSESSMENT — ACTIVITIES OF DAILY LIVING (ADL): CURRENT_FUNCTION: NO ASSISTANCE NEEDED

## 2023-01-03 ENCOUNTER — OFFICE VISIT (OUTPATIENT)
Dept: INTERNAL MEDICINE | Facility: CLINIC | Age: 73
End: 2023-01-03
Payer: MEDICARE

## 2023-01-03 VITALS
HEART RATE: 101 BPM | SYSTOLIC BLOOD PRESSURE: 124 MMHG | WEIGHT: 211 LBS | HEIGHT: 71 IN | BODY MASS INDEX: 29.54 KG/M2 | DIASTOLIC BLOOD PRESSURE: 82 MMHG | TEMPERATURE: 97.8 F | OXYGEN SATURATION: 97 % | RESPIRATION RATE: 16 BRPM

## 2023-01-03 DIAGNOSIS — Z87.39 HISTORY OF GOUT: ICD-10-CM

## 2023-01-03 DIAGNOSIS — Z00.00 ENCOUNTER FOR MEDICARE ANNUAL WELLNESS EXAM: Primary | ICD-10-CM

## 2023-01-03 DIAGNOSIS — Z86.0100 PERSONAL HISTORY OF COLONIC POLYPS: ICD-10-CM

## 2023-01-03 DIAGNOSIS — I10 PRIMARY HYPERTENSION: Chronic | ICD-10-CM

## 2023-01-03 DIAGNOSIS — R35.1 NOCTURIA: ICD-10-CM

## 2023-01-03 DIAGNOSIS — F17.201 TOBACCO ABUSE, IN REMISSION: ICD-10-CM

## 2023-01-03 DIAGNOSIS — N52.9 ERECTILE DYSFUNCTION, UNSPECIFIED ERECTILE DYSFUNCTION TYPE: ICD-10-CM

## 2023-01-03 DIAGNOSIS — R73.01 IMPAIRED FASTING GLUCOSE: Primary | ICD-10-CM

## 2023-01-03 DIAGNOSIS — E78.2 MIXED HYPERLIPIDEMIA: Chronic | ICD-10-CM

## 2023-01-03 DIAGNOSIS — E55.9 VITAMIN D DEFICIENCY: ICD-10-CM

## 2023-01-03 DIAGNOSIS — Z12.5 SCREENING FOR PROSTATE CANCER: ICD-10-CM

## 2023-01-03 DIAGNOSIS — K61.1 PERIRECTAL ABSCESS: ICD-10-CM

## 2023-01-03 DIAGNOSIS — M81.0 OSTEOPOROSIS WITHOUT CURRENT PATHOLOGICAL FRACTURE, UNSPECIFIED OSTEOPOROSIS TYPE: ICD-10-CM

## 2023-01-03 PROBLEM — K60.30 ANAL FISTULA: Status: RESOLVED | Noted: 2021-01-01 | Resolved: 2023-01-03

## 2023-01-03 PROBLEM — M25.512 CHRONIC LEFT SHOULDER PAIN: Status: RESOLVED | Noted: 2021-12-31 | Resolved: 2023-01-03

## 2023-01-03 PROBLEM — M25.512 CHRONIC LEFT SHOULDER PAIN: Status: ACTIVE | Noted: 2021-12-31

## 2023-01-03 PROBLEM — G89.29 CHRONIC LEFT SHOULDER PAIN: Status: RESOLVED | Noted: 2021-12-31 | Resolved: 2023-01-03

## 2023-01-03 PROBLEM — G89.29 CHRONIC LEFT SHOULDER PAIN: Status: ACTIVE | Noted: 2021-12-31

## 2023-01-03 PROBLEM — R10.31 RIGHT GROIN PAIN: Status: RESOLVED | Noted: 2019-10-21 | Resolved: 2023-01-03

## 2023-01-03 LAB
ALBUMIN SERPL BCG-MCNC: 4.6 G/DL (ref 3.5–5.2)
ALBUMIN UR-MCNC: NEGATIVE MG/DL
ALP SERPL-CCNC: 55 U/L (ref 40–129)
ALT SERPL W P-5'-P-CCNC: 20 U/L (ref 10–50)
ANION GAP SERPL CALCULATED.3IONS-SCNC: 13 MMOL/L (ref 7–15)
APPEARANCE UR: CLEAR
AST SERPL W P-5'-P-CCNC: 23 U/L (ref 10–50)
BILIRUB SERPL-MCNC: 0.6 MG/DL
BILIRUB UR QL STRIP: NEGATIVE
BUN SERPL-MCNC: 29.5 MG/DL (ref 8–23)
CALCIUM SERPL-MCNC: 10.7 MG/DL (ref 8.8–10.2)
CHLORIDE SERPL-SCNC: 105 MMOL/L (ref 98–107)
CHOLEST SERPL-MCNC: 193 MG/DL
COLOR UR AUTO: YELLOW
CREAT SERPL-MCNC: 1.02 MG/DL (ref 0.67–1.17)
DEPRECATED CALCIDIOL+CALCIFEROL SERPL-MC: 40 UG/L (ref 20–75)
DEPRECATED HCO3 PLAS-SCNC: 22 MMOL/L (ref 22–29)
ERYTHROCYTE [DISTWIDTH] IN BLOOD BY AUTOMATED COUNT: 11.8 % (ref 10–15)
GFR SERPL CREATININE-BSD FRML MDRD: 79 ML/MIN/1.73M2
GLUCOSE SERPL-MCNC: 111 MG/DL (ref 70–99)
GLUCOSE UR STRIP-MCNC: NEGATIVE MG/DL
HCT VFR BLD AUTO: 44.3 % (ref 40–53)
HDLC SERPL-MCNC: 65 MG/DL
HGB BLD-MCNC: 15.1 G/DL (ref 13.3–17.7)
HGB UR QL STRIP: NEGATIVE
KETONES UR STRIP-MCNC: NEGATIVE MG/DL
LDLC SERPL CALC-MCNC: 97 MG/DL
LEUKOCYTE ESTERASE UR QL STRIP: NEGATIVE
MCH RBC QN AUTO: 32.2 PG (ref 26.5–33)
MCHC RBC AUTO-ENTMCNC: 34.1 G/DL (ref 31.5–36.5)
MCV RBC AUTO: 95 FL (ref 78–100)
NITRATE UR QL: NEGATIVE
NONHDLC SERPL-MCNC: 128 MG/DL
PH UR STRIP: 6 [PH] (ref 5–8)
PLATELET # BLD AUTO: 228 10E3/UL (ref 150–450)
POTASSIUM SERPL-SCNC: 4.1 MMOL/L (ref 3.4–5.3)
PROT SERPL-MCNC: 7.2 G/DL (ref 6.4–8.3)
PSA SERPL-MCNC: 0.99 NG/ML (ref 0–6.5)
RBC # BLD AUTO: 4.69 10E6/UL (ref 4.4–5.9)
SODIUM SERPL-SCNC: 140 MMOL/L (ref 136–145)
SP GR UR STRIP: 1.02 (ref 1–1.03)
TRIGL SERPL-MCNC: 153 MG/DL
URATE SERPL-MCNC: 3.4 MG/DL (ref 3.4–7)
UROBILINOGEN UR STRIP-ACNC: 0.2 E.U./DL
WBC # BLD AUTO: 6.5 10E3/UL (ref 4–11)

## 2023-01-03 PROCEDURE — 81003 URINALYSIS AUTO W/O SCOPE: CPT | Performed by: INTERNAL MEDICINE

## 2023-01-03 PROCEDURE — 99214 OFFICE O/P EST MOD 30 MIN: CPT | Mod: 25 | Performed by: INTERNAL MEDICINE

## 2023-01-03 PROCEDURE — G0439 PPPS, SUBSEQ VISIT: HCPCS | Performed by: INTERNAL MEDICINE

## 2023-01-03 PROCEDURE — 85027 COMPLETE CBC AUTOMATED: CPT | Performed by: INTERNAL MEDICINE

## 2023-01-03 PROCEDURE — G0103 PSA SCREENING: HCPCS | Performed by: INTERNAL MEDICINE

## 2023-01-03 PROCEDURE — 82306 VITAMIN D 25 HYDROXY: CPT | Performed by: INTERNAL MEDICINE

## 2023-01-03 PROCEDURE — 84550 ASSAY OF BLOOD/URIC ACID: CPT | Performed by: INTERNAL MEDICINE

## 2023-01-03 PROCEDURE — 80061 LIPID PANEL: CPT | Performed by: INTERNAL MEDICINE

## 2023-01-03 PROCEDURE — 36415 COLL VENOUS BLD VENIPUNCTURE: CPT | Performed by: INTERNAL MEDICINE

## 2023-01-03 PROCEDURE — 80053 COMPREHEN METABOLIC PANEL: CPT | Performed by: INTERNAL MEDICINE

## 2023-01-03 PROCEDURE — 83036 HEMOGLOBIN GLYCOSYLATED A1C: CPT | Performed by: INTERNAL MEDICINE

## 2023-01-03 RX ORDER — TADALAFIL 20 MG/1
TABLET ORAL
COMMUNITY
Start: 2021-12-31 | End: 2024-01-04

## 2023-01-03 RX ORDER — SENNOSIDES A AND B 8.6 MG/1
1 TABLET, FILM COATED ORAL DAILY
COMMUNITY

## 2023-01-03 RX ORDER — ECONAZOLE NITRATE 10 MG/G
CREAM TOPICAL
COMMUNITY
Start: 2022-09-02

## 2023-01-03 RX ORDER — TRIAMCINOLONE ACETONIDE 1 MG/ML
LOTION TOPICAL
COMMUNITY
Start: 2022-09-02

## 2023-01-03 ASSESSMENT — ACTIVITIES OF DAILY LIVING (ADL): CURRENT_FUNCTION: NO ASSISTANCE NEEDED

## 2023-01-03 ASSESSMENT — ENCOUNTER SYMPTOMS
SORE THROAT: 0
FEVER: 0
PARESTHESIAS: 0
MYALGIAS: 0
CHILLS: 0
PALPITATIONS: 0
CONSTIPATION: 0
DYSURIA: 0
HEMATOCHEZIA: 0
COUGH: 0
JOINT SWELLING: 0
NAUSEA: 0
ARTHRALGIAS: 0
SHORTNESS OF BREATH: 0
WEAKNESS: 0
HEMATURIA: 0
DIZZINESS: 0
DIARRHEA: 0
HEARTBURN: 0
EYE PAIN: 0
NERVOUS/ANXIOUS: 0
HEADACHES: 0
ABDOMINAL PAIN: 0
FREQUENCY: 1

## 2023-01-03 NOTE — PROGRESS NOTES
"SUBJECTIVE:   Charles is a 72 year old who presents for Preventive Visit.    [id number] male here for annual wellness visit and to follow-up medical problems including history of hypertension, hyperlipidemia, perirectal abscess, gout.  See assessment plan for details    Patient has been advised of split billing requirements and indicates understanding: Yes  Are you in the first 12 months of your Medicare coverage?  No    Healthy Habits:     In general, how would you rate your overall health?  Good    Frequency of exercise:  2-3 days/week    Duration of exercise:  15-30 minutes    Do you usually eat at least 4 servings of fruit and vegetables a day, include whole grains    & fiber and avoid regularly eating high fat or \"junk\" foods?  Yes    Taking medications regularly:  Yes    Ability to successfully perform activities of daily living:  No assistance needed    Home Safety:  No safety concerns identified    Hearing Impairment:  Difficulty following a conversation in a noisy restaurant or crowded room    In the past 6 months, have you been bothered by leaking of urine?  No    In general, how would you rate your overall mental or emotional health?  Good      PHQ-2 Total Score: 0    Additional concerns today:  No      Have you ever done Advance Care Planning? (For example, a Health Directive, POLST, or a discussion with a medical provider or your loved ones about your wishes): Yes, patient states has an Advance Care Planning document and will bring a copy to the clinic.       Fall risk  Fallen 2 or more times in the past year?: No  Any fall with injury in the past year?: No    Cognitive Screening   1) Repeat 3 items (Leader, Season, Table)    2) Clock draw: NORMAL  3) 3 item recall: Recalls 3 objects  Results: 3 items recalled: COGNITIVE IMPAIRMENT LESS LIKELY    Mini-CogTM Copyright KOSTAS Castañeda. Licensed by the author for use in [facility]; reprinted with permission (alison@.[facility]). All rights reserved.  "     Do you have sleep apnea, excessive snoring or daytime drowsiness?: no    Reviewed and updated as needed this visit by clinical staff   Tobacco  Allergies  Meds              Reviewed and updated as needed this visit by Provider                 Social History     Tobacco Use     Smoking status: Former     Packs/day: 1.00     Years: 40.00     Pack years: 40.00     Types: Cigarettes     Quit date: 2015     Years since quittin.3     Smokeless tobacco: Never   Substance Use Topics     Alcohol use: Yes     Alcohol/week: 21.0 standard drinks     Comment: 3-4/day         Alcohol Use 2022   Prescreen: >3 drinks/day or >7 drinks/week? Yes   Prescreen: >3 drinks/day or >7 drinks/week? -   AUDIT SCORE  6               Current providers sharing in care for this patient include:   Patient Care Team:  Kalpesh Aldrich MD as PCP - General (Internal Medicine)  Kalpesh Aldrich MD as Assigned PCP    The following health maintenance items are reviewed in Epic and correct as of today:  Health Maintenance   Topic Date Due     ANNUAL REVIEW OF HM ORDERS  Never done     DTAP/TDAP/TD IMMUNIZATION (2 - Td or Tdap) 2022     MEDICARE ANNUAL WELLNESS VISIT  2022     LUNG CANCER SCREENING  2023     FALL RISK ASSESSMENT  2024     ADVANCE CARE PLANNING  2025     LIPID  2026     COLORECTAL CANCER SCREENING  2028     HEPATITIS C SCREENING  Completed     PHQ-2 (once per calendar year)  Completed     INFLUENZA VACCINE  Completed     Pneumococcal Vaccine: 65+ Years  Completed     ZOSTER IMMUNIZATION  Completed     AORTIC ANEURYSM SCREENING (SYSTEM ASSIGNED)  Completed     COVID-19 Vaccine  Completed     IPV IMMUNIZATION  Aged Out     MENINGITIS IMMUNIZATION  Aged Out     Lab work is in process          Review of Systems   Constitutional: Negative for chills and fever.   HENT: Negative for congestion, ear pain, hearing loss and sore throat.    Eyes: Negative for pain and visual  "disturbance.   Respiratory: Negative for cough and shortness of breath.    Cardiovascular: Negative for chest pain, palpitations and peripheral edema.   Gastrointestinal: Negative for abdominal pain, constipation, diarrhea, heartburn, hematochezia and nausea.   Genitourinary: Positive for frequency and impotence. Negative for dysuria, genital sores, hematuria, penile discharge and urgency.   Musculoskeletal: Negative for arthralgias, joint swelling and myalgias.   Skin: Negative for rash.   Neurological: Negative for dizziness, weakness, headaches and paresthesias.   Psychiatric/Behavioral: Negative for mood changes. The patient is not nervous/anxious.          OBJECTIVE:   BP (!) 142/88 (BP Location: Right arm, Patient Position: Sitting, Cuff Size: Adult Regular)   Pulse 101   Temp 97.8  F (36.6  C) (Oral)   Resp 16   Ht 1.791 m (5' 10.5\")   Wt 95.7 kg (211 lb)   SpO2 97%   BMI 29.85 kg/m   Estimated body mass index is 29.85 kg/m  as calculated from the following:    Height as of this encounter: 1.791 m (5' 10.5\").    Weight as of this encounter: 95.7 kg (211 lb).  Physical Exam  EYES: Eyelids, conjunctiva, and sclera were normal. Pupils were normal. Cornea, iris, and lens were normal bilaterally.  HEAD, EARS, NOSE, MOUTH, AND THROAT: Head and face were normal.  Bilateral impacted cerumen.  NECK: Neck appearance was normal. There were no neck masses and the thyroid was not enlarged and no nodules are felt.  No lymphadenopathy.  RESPIRATORY: Breathing pattern was normal and the chest moved symmetrically.   Lung sounds were normal and there were no rales or wheezes.  CARDIOVASCULAR: Heart rate and rhythm were normal.  S1 and S2 were normal and there were no extra sounds or murmurs. Peripheral pulses in arms and legs were normal.  There was no peripheral edema.  No carotid bruits.  GASTROINTESTINAL:  Bowel sounds were present.   Palpation detected no tenderness, mass, or enlarged organs.   MUSCULOSKELETAL: " Skeletal configuration was normal and muscle mass was normal for age. Joint appearance was overall normal.  LYMPHATIC: There were no enlarged nodes.  SKIN/HAIR/NAILS: Skin color was normal.  There were no concerning skin lesions.  NEUROLOGIC: The patient was alert and oriented to person, place, time, and circumstance. Speech was normal. Cranial nerves were normal. Motor strength was normal for age. The patient was normally coordinated.  Sensation intact.  PSYCHIATRIC:  Mood and affect were normal and the patient had normal recent and remote memory. The patient's judgment and insight were normal.        ASSESSMENT / PLAN:   1. Encounter for Medicare annual wellness exam  Immunizations are reviewed and recommending getting Td vaccine updated at his pharmacy.  Everything else is up-to-date.  He has a living well.  Former smoker quitting in 2015.  Discussed using alcohol in moderation.  Regular exercise and good diet habits to maintain a healthy weight discussed.  Up to date with colonoscopies and this should be repeated in December 2023.  Prostate exam is deferred but I will check a PSA for prostate cancer screening.  Dementia and depression screening completed.  He is getting an annual eye exam completed.  Seeing a dentist every 6 months recommended.  Skin exam performed and recommending regular use of sunblock.  He sees a dermatologist every year.  Hepatitis C antibody for screening was normal.  Will screen for diabetes with fasting glucose.  CT in 2015 without AAA.  Consider ordering ultrasound in the next year to screen for AAA now that he is older than 65.    2. Primary hypertension  Blood pressure is looking well controlled with amlodipine and losartan.  Tolerating medication without side effects  - CBC with platelets; Future  - Comprehensive metabolic panel (BMP + Alb, Alk Phos, ALT, AST, Total. Bili, TP); Future  - UA Macro with Reflex to Micro and Culture - lab collect; Future    3. Mixed hyperlipidemia  We  "will recheck a lipid profile taking combination of simvastatin and fenofibrate.  Tolerating the combination without problems.  He has mixed hyperlipidemia.  - Lipid Profile (Chol, Trig, HDL, LDL calc); Future    4. Perirectal abscess  He underwent surgery twice including last in July for perirectal abscess.  Followed up with Dr. Maxwell and area has healed well although he still having some occasional residual discomfort.    5. Tobacco abuse, in remission  Former smoker with over 30-pack-year history.  Quit 2015.  Enlarging pulmonary nodule resected at that time proved to be benign but he continues annual low-dose CT scan which will be due again in December 2023.  Recent scan showing stable small nodules and no new findings.    6. History of gout  He continues on allopurinol to manage gout and has had no recurrence.  Obtain uric acid level.  - Uric acid; Future    7. Nocturia  Ongoing problem with urinary frequency and nocturia.  He is not interested in medication at this time    8. Erectile dysfunction, unspecified erectile dysfunction type  He has a prescription for Cialis    9. Osteoporosis without current pathological fracture, unspecified osteoporosis type  DEXA with T score -2.0 but history of stress fracture involving femur.  He declines treatment for osteoporosis.  He should maintain good calcium and vitamin D intake    10. Vitamin D deficiency  Rechecking vitamin D level as above  - Vitamin D deficiency screening; Future    11. Personal history of colonic polyps  He will be due for another colonoscopy in December 2023    12. Screening for prostate cancer  Exam deferred but will continue annual PSA for prostate cancer screening.  - PSA, screen; Future            BMI:   Estimated body mass index is 29.85 kg/m  as calculated from the following:    Height as of this encounter: 1.791 m (5' 10.5\").    Weight as of this encounter: 95.7 kg (211 lb).         He reports that he quit smoking about 7 years ago. He has a " 40.00 pack-year smoking history. He has never used smokeless tobacco.      Appropriate preventive services were discussed with this patient, including applicable screening as appropriate for cardiovascular disease, diabetes, osteopenia/osteoporosis, and glaucoma.  As appropriate for age/gender, discussed screening for colorectal cancer, prostate cancer, breast cancer, and cervical cancer. Checklist reviewing preventive services available has been given to the patient.    Reviewed patients plan of care and provided an AVS. The Basic Care Plan (routine screening as documented in Health Maintenance) for Charles meets the Care Plan requirement. This Care Plan has been established and reviewed with the Patient.          Kalpesh Aldrich MD  [facility]    Identified Health Risks:    The patient was provided with written information regarding signs of hearing loss.  He is at risk for lack of exercise and has been provided with information to increase physical activity for the benefit of his well-being.  The patient was counseled and encouraged to consider modifying their diet and eating habits. He was provided with information on recommended healthy diet options.

## 2023-01-03 NOTE — PATIENT INSTRUCTIONS
Annual flu shot every fall    You are due for a tetanus vaccine.  You will get a Td vaccine at your pharmacy    Low-dose CT scan for lung cancer screening should be reviewed.  Annually due again December 2023.  Contact me in November and I will send you a referral to get this scheduled    Colonoscopy will be due in December 2023    Continue to see your eye doctor every year    Annual visit with a dermatologist for total-body exam    Set a goal of getting at least 40 minutes of exercise 3-4 times per week and modify your diet with a goal of losing 10 to 15 pounds over the next year      Patient Education   Personalized Prevention Plan  You are due for the preventive services outlined below.  Your care team is available to assist you in scheduling these services.  If you have already completed any of these items, please share that information with your care team to update in your medical record.  Health Maintenance Due   Topic Date Due    Diptheria Tetanus Pertussis (DTAP/TDAP/TD) Vaccine (2 - Td or Tdap) 08/06/2022       Signs of Hearing Loss      Hearing much better with one ear can be a sign of hearing loss.   Hearing loss is a problem shared by many people. In fact, it is one of the most common health problems, particularly as people age. Most people age 65 and older have some hearing loss. By age 80, almost everyone does. Hearing loss often occurs slowly over the years. So you may not realize your hearing has gotten worse.  Have your hearing checked  Call your healthcare provider if you:  Have to strain to hear normal conversation  Have to watch other people s faces very carefully to follow what they re saying  Need to ask people to repeat what they ve said  Often misunderstand what people are saying  Turn the volume of the television or radio up so high that others complain  Feel that people are mumbling when they re talking to you  Find that the effort to hear leaves you feeling tired and irritated  Notice,  when using the phone, that you hear better with one ear than the other  [employer] last reviewed this educational content on 1/1/2020 2000-2021 [employer]. All rights reserved. This information is not intended as a substitute for professional medical care. Always follow your healthcare professional's instructions.             Exercise for a Healthier Heart  You may wonder how you can improve the health of your heart. If you re thinking about exercise, you re on the right track. You don t need to become an athlete. But you do need a certain amount of brisk exercise to help strengthen your heart. If you have been diagnosed with a heart condition, your healthcare provider may advise exercise to help stabilize your condition. To help make exercise a habit, choose safe, fun activities.      Exercise with a friend. When activity is fun, you're more likely to stick with it.   Before you start  Check with your healthcare provider before starting an exercise program. This is especially important if you have not been active for a while. It's also important if you have a long-term (chronic) health problem such as heart disease, diabetes, or obesity. Or if you are at high risk for having these problems.   Why exercise?  Exercising regularly offers many healthy rewards. It can help you do all of the following:   Improve your blood cholesterol level to help prevent further heart trouble  Lower your blood pressure to help prevent a stroke or heart attack  Control diabetes, or reduce your risk of getting this disease  Improve your heart and lung function  Reach and stay at a healthy weight  Make your muscles stronger so you can stay active  Prevent falls and fractures by slowing the loss of bone mass (osteoporosis)  Manage stress better  Reduce your blood pressure  Improve your sense of self and your body image  Exercise tips    Ease into your routine. Set small goals. Then build on them. If you are not sure what  your activity level should be, talk with your healthcare provider first before starting an exercise routine.  Exercise on most days. Aim for a total of 150 minutes (2 hours and 30 minutes) or more of moderate-intensity aerobic activity each week. Or 75 minutes (1 hour and 15 minutes) or more of vigorous-intensity aerobic activity each week. Or try for a combination of both. Moderate activity means that you breathe heavier and your heart rate increases but you can still talk. Think about doing 40 minutes of moderate exercise, 3 to 4 times a week. For best results, activity should last for about 40 minutes to lower blood pressure and cholesterol. It's OK to work up to the 40-minute period over time. Examples of moderate-intensity activity are walking 1 mile in 15 minutes. Or doing 30 to 45 minutes of yard work.  Step up your daily activity level.  Along with your exercise program, try being more active the whole day. Walk instead of drive. Or park further away so that you take more steps each day. Do more household tasks or yard work. You may not be able to meet the advised mount of physical activity. But doing some moderate- or vigorous-intensity aerobic activity can help reduce your risk for heart disease. Your healthcare provider can help you figure out what is best for you.  Choose 1 or more activities you enjoy.  Walking is one of the easiest things you can do. You can also try swimming, riding a bike, dancing, or taking an exercise class.    When to call your healthcare provider  Call your healthcare provider if you have any of these:   Chest pain or feel dizzy or lightheaded  Burning, tightness, pressure, or heaviness in your chest, neck, shoulders, back, or arms  Abnormal shortness of breath  More joint or muscle pain  A very fast or irregular heartbeat (palpitations)  [employer] last reviewed this educational content on 7/1/2019 2000-2021 [employer]. All rights reserved. This information is  not intended as a substitute for professional medical care. Always follow your healthcare professional's instructions.          Understanding USDA MyPlate  The USDA has guidelines to help you make healthy food choices. These are called MyPlate. MyPlate shows the food groups that make up healthy meals using the image of a place setting. Before you eat, think about the healthiest choices for what to put on your plate or in your cup or bowl. To learn more about building a healthy plate, visit www.choosemyplate.gov.    The food groups  Fruits. Any fruit or 100% fruit juice counts as part of the Fruit Group. Fruits may be fresh, canned, frozen, or dried, and may be whole, cut-up, or pureed. Make 1/2 of your plate fruits and vegetables.  Vegetables. Any vegetable or 100% vegetable juice counts as a member of the Vegetable Group. Vegetables may be fresh, frozen, canned, or dried. They can be served raw or cooked and may be whole, cut-up, or mashed. Make 1/2 of your plate fruits and vegetables.  Grains. All foods made from grains are part of the Grains Group. These include wheat, rice, oats, cornmeal, and barley. Grains are often used to make foods such as bread, pasta, oatmeal, cereal, tortillas, and grits. Grains should be no more than 1/4 of your plate. At least half of your grains should be whole grains.  Protein. This group includes meat, poultry, seafood, beans and peas, eggs, processed soy products (such as tofu), nuts (including nut butters), and seeds. Make protein choices no more than 1/4 of your plate. Meat and poultry choices should be lean or low fat.  Dairy. The Dairy Group includes all fluid milk products and foods made from milk that contain calcium, such as yogurt and cheese. (Foods that have little calcium, such as cream, butter, and cream cheese, are not part of this group.) Most dairy choices should be low-fat or fat-free.  Oils. Oils aren't a food group, but they do contain essential nutrients. However  it's important to watch your intake of oils. These are fats that are liquid at room temperature. They include canola, corn, olive, soybean, vegetable, and sunflower oil. Foods that are mainly oil include mayonnaise, certain salad dressings, and soft margarines. You likely already get your daily oil allowance from the foods you eat.  Things to limit  Eating healthy also means limiting these things in your diet:     Salt (sodium). Many processed foods have a lot of sodium. To keep sodium intake down, eat fresh vegetables, meats, poultry, and seafood when possible. Purchase low-sodium, reduced-sodium, or no-salt-added food products at the store. And don't add salt to your meals at home. Instead, season them with herbs and spices such as dill, oregano, cumin, and paprika. Or try adding flavor with lemon or lime zest and juice.  Saturated fat. Saturated fats are most often found in animal products such as beef, pork, and chicken. They are often solid at room temperature, such as butter. To reduce your saturated fat intake, choose leaner cuts of meat and poultry. And try healthier cooking methods such as grilling, broiling, roasting, or baking. For a simple lower-fat swap, use plain nonfat yogurt instead of mayonnaise when making potato salad or macaroni salad.  Added sugars. These are sugars added to foods. They are in foods such as ice cream, candy, soda, fruit drinks, sports drinks, energy drinks, cookies, pastries, jams, and syrups. Cut down on added sugars by sharing sweet treats with a family member or friend. You can also choose fruit for dessert, and drink water or other unsweetened beverages.     [employer] last reviewed this educational content on 6/1/2020 2000-2021 [employer]. All rights reserved. This information is not intended as a substitute for professional medical care. Always follow your healthcare professional's instructions.

## 2023-01-04 LAB — HBA1C MFR BLD: 5.3 % (ref 0–5.6)

## 2023-01-07 DIAGNOSIS — Z87.39 HISTORY OF GOUT: Primary | ICD-10-CM

## 2023-01-07 RX ORDER — ALLOPURINOL 100 MG/1
200 TABLET ORAL DAILY
Qty: 180 TABLET | Refills: 3 | Status: SHIPPED | OUTPATIENT
Start: 2023-01-07 | End: 2024-01-04

## 2023-01-23 DIAGNOSIS — I10 ESSENTIAL HYPERTENSION: ICD-10-CM

## 2023-01-24 RX ORDER — AMLODIPINE BESYLATE 5 MG/1
TABLET ORAL
Qty: 90 TABLET | Refills: 3 | Status: SHIPPED | OUTPATIENT
Start: 2023-01-24 | End: 2024-01-04

## 2023-01-24 RX ORDER — LOSARTAN POTASSIUM 100 MG/1
TABLET ORAL
Qty: 90 TABLET | Refills: 3 | Status: SHIPPED | OUTPATIENT
Start: 2023-01-24 | End: 2024-01-04

## 2023-01-24 NOTE — TELEPHONE ENCOUNTER
"Routing refill request to provider for review/approval because:  Drug interaction warning    Last Written Prescription Date:  10/5/22  Last Fill Quantity: 90,  # refills: 0   Last office visit provider:  1/3/23     Requested Prescriptions   Pending Prescriptions Disp Refills     losartan (COZAAR) 100 MG tablet [Pharmacy Med Name: LOSARTAN POTASSIUM 100MG TABS] 90 tablet 0     Sig: TAKE 1 TABLET BY MOUTH ONCE DAILY       Angiotensin-II Receptors Passed - 1/23/2023  8:33 AM        Passed - Last blood pressure under 140/90 in past 12 months     BP Readings from Last 3 Encounters:   01/03/23 124/82   02/04/22 130/83   12/31/21 136/82                 Passed - Recent (12 mo) or future (30 days) visit within the authorizing provider's specialty     Patient has had an office visit with the authorizing provider or a provider within the authorizing providers department within the previous 12 mos or has a future within next 30 days. See \"Patient Info\" tab in inbasket, or \"Choose Columns\" in Meds & Orders section of the refill encounter.              Passed - Medication is active on med list        Passed - Patient is age 18 or older        Passed - Normal serum creatinine on file in past 12 months     Recent Labs   Lab Test 01/03/23  1001   CR 1.02       Ok to refill medication if creatinine is low          Passed - Normal serum potassium on file in past 12 months     Recent Labs   Lab Test 01/03/23  1001   POTASSIUM 4.1                       amLODIPine (NORVASC) 5 MG tablet [Pharmacy Med Name: AMLODIPINE BESYLATE 5MG TABS] 90 tablet 0     Sig: TAKE ONE TABLET BY MOUTH EVERY DAY       Calcium Channel Blockers Protocol  Passed - 1/23/2023  8:33 AM        Passed - Blood pressure under 140/90 in past 12 months     BP Readings from Last 3 Encounters:   01/03/23 124/82   02/04/22 130/83   12/31/21 136/82                 Passed - Recent (12 mo) or future (30 days) visit within the authorizing provider's specialty     Patient has had " "an office visit with the authorizing provider or a provider within the authorizing providers department within the previous 12 mos or has a future within next 30 days. See \"Patient Info\" tab in inbasket, or \"Choose Columns\" in Meds & Orders section of the refill encounter.              Passed - Medication is active on med list        Passed - Patient is age 18 or older        Passed - Normal serum creatinine on file in past 12 months     Recent Labs   Lab Test 01/03/23  1001   CR 1.02       Ok to refill medication if creatinine is low               Callie Garber, RN 01/23/23 7:37 PM  "

## 2023-01-27 ENCOUNTER — NURSE TRIAGE (OUTPATIENT)
Dept: INTERNAL MEDICINE | Facility: CLINIC | Age: 73
End: 2023-01-27
Payer: MEDICARE

## 2023-01-27 NOTE — TELEPHONE ENCOUNTER
Unfortunately no availability on my schedule today.  I agree with recommendation that if he is feeling worse, he should be evaluated at urgent care.

## 2023-01-27 NOTE — TELEPHONE ENCOUNTER
"Nurse Triage SBAR  Is this a 2nd Level Triage? YES, LICENSED PRACTITIONER REVIEW IS REQUIRED    Situation: Pt calling to report cold/flu like symptoms x3 days.   Negative Covid test this morning.     Pt feels he has a fever - woke up sweating but has not taken his temp.   Denies SOB but states he has been wheezing a lot.   Reports dry cough that interferes with his sleep.   Headache and sore throat present - \"My mouth and throat feel super inflamed and it's harder to swallow because my throat hurts\"    No chest pain.   Pt feels his sinuses are very plugged.    He states \"I haven't felt this bad in 40 years\".    Background: Symptoms present x3 days.   Negative Covid test this morning.     Assessment: Cough with wheezing.     Protocol Recommended Disposition:   Go To Office Now    Recommendation: Recommended coming into [facility] today due to cough, wheezing, and sore throat. PCP does not have any availability.   Pt refused to meet with someone else. Refused to go to [facility].  I did offer to look at other clinics for appointments today, Pt refused.   He states \"I guess I'll tough it out\" - Pt hung up.      Routed to provider    Does the patient meet one of the following criteria for ADS visit consideration? No      Reason for Disposition    Wheezing is present    Additional Information    Negative: Bluish (or gray) lips or face    Negative: SEVERE difficulty breathing (e.g., struggling for each breath, speaks in single words)    Negative: Rapid onset of cough and has hives    Negative: Coughing started suddenly after medicine, an allergic food or bee sting    Negative: Difficulty breathing after exposure to flames, smoke, or fumes    Negative: Sounds like a life-threatening emergency to the triager    Negative: Previous asthma attacks and this feels like asthma attack    Negative: Dry cough (non-productive; no sputum or minimal clear sputum) and within 14 days of COVID-19 Exposure    Negative: MODERATE difficulty breathing " "(e.g., speaks in phrases, SOB even at rest, pulse 100-120) and still present when not coughing    Negative: Chest pain present when not coughing    Negative: Passed out (i.e., fainted, collapsed and was not responding)    Negative: Patient sounds very sick or weak to the triager    Negative: Increasing ankle swelling    Negative: Fever > 100.0 F (37.8 C) and bedridden (e.g., nursing home patient, stroke, chronic illness, recovering from surgery)    Negative: Fever > 100.0 F (37.8 C) and has diabetes mellitus or a weak immune system (e.g., HIV positive, cancer chemotherapy, organ transplant, splenectomy, chronic steroids)    Negative: Fever > 103 F (39.4 C)    Negative: Fever > 101 F (38.3 C) and over 60 years of age    Negative: Coughed up > 1 tablespoon (15 ml) blood (Exception: Blood-tinged sputum.)    Negative: MILD difficulty breathing (e.g., minimal/no SOB at rest, SOB with walking, pulse <100) and still present when not coughing    Answer Assessment - Initial Assessment Questions  1. ONSET: \"When did the cough begin?\"       Three days.   2. SEVERITY: \"How bad is the cough today?\"       Pretty bad -   3. SPUTUM: \"Describe the color of your sputum\" (none, dry cough; clear, white, yellow, green)      Not productive.   4. HEMOPTYSIS: \"Are you coughing up any blood?\" If so ask: \"How much?\" (flecks, streaks, tablespoons, etc.)      *No Answer*  5. DIFFICULTY BREATHING: \"Are you having difficulty breathing?\" If Yes, ask: \"How bad is it?\" (e.g., mild, moderate, severe)     - MILD: No SOB at rest, mild SOB with walking, speaks normally in sentences, can lie down, no retractions, pulse < 100.     - MODERATE: SOB at rest, SOB with minimal exertion and prefers to sit, cannot lie down flat, speaks in phrases, mild retractions, audible wheezing, pulse 100-120.     - SEVERE: Very SOB at rest, speaks in single words, struggling to breathe, sitting hunched forward, retractions, pulse > 120       Wheezing, not short of breath. " "  6. FEVER: \"Do you have a fever?\" If Yes, ask: \"What is your temperature, how was it measured, and when did it start?\"      Pt feels he does.   7. CARDIAC HISTORY: \"Do you have any history of heart disease?\" (e.g., heart attack, congestive heart failure)       *No Answer*  8. LUNG HISTORY: \"Do you have any history of lung disease?\"  (e.g., pulmonary embolus, asthma, emphysema)      *No Answer*  9. PE RISK FACTORS: \"Do you have a history of blood clots?\" (or: recent major surgery, recent prolonged travel, bedridden)      *No Answer*  10. OTHER SYMPTOMS: \"Do you have any other symptoms?\" (e.g., runny nose, wheezing, chest pain)        Runny nose, wheezing. No chest pain.   11. PREGNANCY: \"Is there any chance you are pregnant?\" \"When was your last menstrual period?\"        NA  12. TRAVEL: \"Have you traveled out of the country in the last month?\" (e.g., travel history, exposures)        NA    Protocols used: COUGH-A-OH      "

## 2023-01-27 NOTE — TELEPHONE ENCOUNTER
LMOM that Dr Aldrich had no openings and he recommended that he be seen at [facility] or [facility] Room

## 2023-04-22 DIAGNOSIS — E78.2 MIXED HYPERLIPIDEMIA: ICD-10-CM

## 2023-04-23 RX ORDER — SIMVASTATIN 20 MG
TABLET ORAL
Qty: 90 TABLET | Refills: 2 | Status: SHIPPED | OUTPATIENT
Start: 2023-04-23 | End: 2024-01-04

## 2023-04-23 NOTE — TELEPHONE ENCOUNTER
"    Last Written Prescription Date:  4/28/2022  Last Fill Quantity: 90,  # refills: 3   Last office visit provider:  1/3/2023     Requested Prescriptions   Pending Prescriptions Disp Refills     simvastatin (ZOCOR) 20 MG tablet [Pharmacy Med Name: SIMVASTATIN 20MG TABS] 90 tablet 3     Sig: TAKE ONE TABLET BY MOUTH EVERY DAY       Statins Protocol Passed - 4/23/2023 10:28 AM        Passed - LDL on file in past 12 months     Recent Labs   Lab Test 01/03/23  1001   LDL 97             Passed - No abnormal creatine kinase in past 12 months     No lab results found.             Passed - Recent (12 mo) or future (30 days) visit within the authorizing provider's specialty     Patient has had an office visit with the authorizing provider or a provider within the authorizing providers department within the previous 12 mos or has a future within next 30 days. See \"Patient Info\" tab in inbasket, or \"Choose Columns\" in Meds & Orders section of the refill encounter.              Passed - Medication is active on med list        Passed - Patient is age 18 or older             Justine Hardy RN 04/23/23 10:28 AM  "

## 2023-07-10 ENCOUNTER — TRANSFERRED RECORDS (OUTPATIENT)
Dept: HEALTH INFORMATION MANAGEMENT | Facility: CLINIC | Age: 73
End: 2023-07-10
Payer: MEDICARE

## 2023-10-12 ENCOUNTER — TRANSFERRED RECORDS (OUTPATIENT)
Dept: HEALTH INFORMATION MANAGEMENT | Facility: CLINIC | Age: 73
End: 2023-10-12
Payer: MEDICARE

## 2023-11-01 ENCOUNTER — PATIENT OUTREACH (OUTPATIENT)
Dept: GASTROENTEROLOGY | Facility: CLINIC | Age: 73
End: 2023-11-01
Payer: MEDICARE

## 2023-11-01 DIAGNOSIS — Z12.11 SPECIAL SCREENING FOR MALIGNANT NEOPLASMS, COLON: Primary | ICD-10-CM

## 2023-11-01 NOTE — PROGRESS NOTES
"CRC Screening Colonoscopy Referral Review    Patient meets the inclusion criteria for screening colonoscopy standing order.    Ordering/Referring Provider:  Kalpesh Aldrich      BMI: Estimated body mass index is 29.85 kg/m  as calculated from the following:    Height as of 1/3/23: 1.791 m (5' 10.5\").    Weight as of 1/3/23: 95.7 kg (211 lb).     Sedation:  Does patient have any of the following conditions affecting sedation?  No medical conditions affecting sedation.    Previous Scopes:  Any previous recommendations or follow up needs based on previous scope?  Needs Extended prep    Medical Concerns to Postpone Order:  Does patient have any of the following medical concerns that should postpone/delay colonoscopy referral?  No medical conditions affecting colonoscopy referral.    Final Referral Details:  Based on patient's medical history patient is appropriate for referral order with moderate sedation. If patient's BMI > 50 do not schedule in ASC.  "

## 2023-11-06 ENCOUNTER — MYC MEDICAL ADVICE (OUTPATIENT)
Dept: INTERNAL MEDICINE | Facility: CLINIC | Age: 73
End: 2023-11-06
Payer: MEDICARE

## 2023-11-13 DIAGNOSIS — Z87.891 PERSONAL HISTORY OF TOBACCO USE: Primary | ICD-10-CM

## 2023-12-13 ENCOUNTER — TRANSFERRED RECORDS (OUTPATIENT)
Dept: HEALTH INFORMATION MANAGEMENT | Facility: CLINIC | Age: 73
End: 2023-12-13
Payer: MEDICARE

## 2023-12-18 ENCOUNTER — HOSPITAL ENCOUNTER (OUTPATIENT)
Dept: CT IMAGING | Facility: CLINIC | Age: 73
Discharge: HOME | End: 2023-12-18
Attending: INTERNAL MEDICINE | Admitting: INTERNAL MEDICINE
Payer: MEDICARE

## 2023-12-18 DIAGNOSIS — Z87.891 PERSONAL HISTORY OF TOBACCO USE: ICD-10-CM

## 2023-12-18 PROCEDURE — 71271 CT THORAX LUNG CANCER SCR C-: CPT

## 2023-12-19 PROBLEM — F17.201 TOBACCO ABUSE, IN REMISSION: Status: ACTIVE | Noted: 2023-12-19

## 2023-12-19 PROBLEM — R91.1 PULMONARY NODULE: Status: ACTIVE | Noted: 2023-12-19

## 2023-12-28 ASSESSMENT — ENCOUNTER SYMPTOMS
NAUSEA: 0
COUGH: 0
JOINT SWELLING: 0
HEARTBURN: 0
CONSTIPATION: 0
WEAKNESS: 0
HEMATURIA: 0
ARTHRALGIAS: 0
SHORTNESS OF BREATH: 0
NERVOUS/ANXIOUS: 0
EYE PAIN: 0
DIARRHEA: 0
DYSURIA: 0
MYALGIAS: 0
SORE THROAT: 0
CHILLS: 0
PARESTHESIAS: 0
ABDOMINAL PAIN: 0
HEADACHES: 0
DIZZINESS: 0
PALPITATIONS: 0
FREQUENCY: 1
HEMATOCHEZIA: 0
FEVER: 0

## 2023-12-28 ASSESSMENT — ACTIVITIES OF DAILY LIVING (ADL): CURRENT_FUNCTION: NO ASSISTANCE NEEDED

## 2024-01-04 ENCOUNTER — OFFICE VISIT (OUTPATIENT)
Dept: INTERNAL MEDICINE | Facility: CLINIC | Age: 74
End: 2024-01-04
Payer: MEDICARE

## 2024-01-04 VITALS
SYSTOLIC BLOOD PRESSURE: 128 MMHG | BODY MASS INDEX: 28.7 KG/M2 | WEIGHT: 205 LBS | DIASTOLIC BLOOD PRESSURE: 78 MMHG | RESPIRATION RATE: 16 BRPM | HEIGHT: 71 IN | HEART RATE: 97 BPM | TEMPERATURE: 97.7 F | OXYGEN SATURATION: 96 %

## 2024-01-04 DIAGNOSIS — Z12.5 SCREENING FOR PROSTATE CANCER: ICD-10-CM

## 2024-01-04 DIAGNOSIS — L72.3 SEBACEOUS CYST: ICD-10-CM

## 2024-01-04 DIAGNOSIS — Z87.891 PERSONAL HISTORY OF TOBACCO USE: ICD-10-CM

## 2024-01-04 DIAGNOSIS — F17.201 TOBACCO ABUSE, IN REMISSION: ICD-10-CM

## 2024-01-04 DIAGNOSIS — Z87.39 HISTORY OF GOUT: ICD-10-CM

## 2024-01-04 DIAGNOSIS — Z86.0100 PERSONAL HISTORY OF COLONIC POLYPS: ICD-10-CM

## 2024-01-04 DIAGNOSIS — R35.1 NOCTURIA: ICD-10-CM

## 2024-01-04 DIAGNOSIS — Z00.00 ENCOUNTER FOR MEDICARE ANNUAL WELLNESS EXAM: Primary | ICD-10-CM

## 2024-01-04 DIAGNOSIS — I10 PRIMARY HYPERTENSION: Chronic | ICD-10-CM

## 2024-01-04 DIAGNOSIS — E78.2 MIXED HYPERLIPIDEMIA: Chronic | ICD-10-CM

## 2024-01-04 DIAGNOSIS — N52.9 ERECTILE DYSFUNCTION, UNSPECIFIED ERECTILE DYSFUNCTION TYPE: ICD-10-CM

## 2024-01-04 DIAGNOSIS — E55.9 VITAMIN D DEFICIENCY: ICD-10-CM

## 2024-01-04 DIAGNOSIS — R91.1 PULMONARY NODULE: ICD-10-CM

## 2024-01-04 PROBLEM — K61.1 PERIRECTAL ABSCESS: Status: RESOLVED | Noted: 2023-01-03 | Resolved: 2024-01-04

## 2024-01-04 LAB
ALBUMIN SERPL BCG-MCNC: 4.6 G/DL (ref 3.5–5.2)
ALBUMIN UR-MCNC: NEGATIVE MG/DL
ALP SERPL-CCNC: 59 U/L (ref 40–150)
ALT SERPL W P-5'-P-CCNC: 31 U/L (ref 0–70)
ANION GAP SERPL CALCULATED.3IONS-SCNC: 11 MMOL/L (ref 7–15)
APPEARANCE UR: CLEAR
AST SERPL W P-5'-P-CCNC: 34 U/L (ref 0–45)
BILIRUB SERPL-MCNC: 0.7 MG/DL
BILIRUB UR QL STRIP: NEGATIVE
BUN SERPL-MCNC: 22 MG/DL (ref 8–23)
CALCIUM SERPL-MCNC: 10.7 MG/DL (ref 8.8–10.2)
CHLORIDE SERPL-SCNC: 102 MMOL/L (ref 98–107)
CHOLEST SERPL-MCNC: 188 MG/DL
COLOR UR AUTO: YELLOW
CREAT SERPL-MCNC: 1.11 MG/DL (ref 0.67–1.17)
DEPRECATED HCO3 PLAS-SCNC: 22 MMOL/L (ref 22–29)
EGFRCR SERPLBLD CKD-EPI 2021: 71 ML/MIN/1.73M2
ERYTHROCYTE [DISTWIDTH] IN BLOOD BY AUTOMATED COUNT: 11.6 % (ref 10–15)
FASTING STATUS PATIENT QL REPORTED: ABNORMAL
GLUCOSE SERPL-MCNC: 108 MG/DL (ref 70–99)
GLUCOSE UR STRIP-MCNC: NEGATIVE MG/DL
HCT VFR BLD AUTO: 44.6 % (ref 40–53)
HDLC SERPL-MCNC: 50 MG/DL
HGB BLD-MCNC: 15.7 G/DL (ref 13.3–17.7)
HGB UR QL STRIP: NEGATIVE
KETONES UR STRIP-MCNC: NEGATIVE MG/DL
LDLC SERPL CALC-MCNC: 90 MG/DL
LEUKOCYTE ESTERASE UR QL STRIP: NEGATIVE
MCH RBC QN AUTO: 32.5 PG (ref 26.5–33)
MCHC RBC AUTO-ENTMCNC: 35.2 G/DL (ref 31.5–36.5)
MCV RBC AUTO: 92 FL (ref 78–100)
NITRATE UR QL: NEGATIVE
NONHDLC SERPL-MCNC: 138 MG/DL
PH UR STRIP: 6 [PH] (ref 5–8)
PLATELET # BLD AUTO: 199 10E3/UL (ref 150–450)
POTASSIUM SERPL-SCNC: 4.2 MMOL/L (ref 3.4–5.3)
PROT SERPL-MCNC: 7.5 G/DL (ref 6.4–8.3)
PSA SERPL DL<=0.01 NG/ML-MCNC: 1.12 NG/ML (ref 0–6.5)
RBC # BLD AUTO: 4.83 10E6/UL (ref 4.4–5.9)
SODIUM SERPL-SCNC: 135 MMOL/L (ref 135–145)
SP GR UR STRIP: 1.01 (ref 1–1.03)
TRIGL SERPL-MCNC: 239 MG/DL
URATE SERPL-MCNC: 5.5 MG/DL (ref 3.4–7)
UROBILINOGEN UR STRIP-ACNC: 0.2 E.U./DL
VIT D+METAB SERPL-MCNC: 55 NG/ML (ref 20–50)
WBC # BLD AUTO: 5.1 10E3/UL (ref 4–11)

## 2024-01-04 PROCEDURE — 80061 LIPID PANEL: CPT | Performed by: INTERNAL MEDICINE

## 2024-01-04 PROCEDURE — G0103 PSA SCREENING: HCPCS | Performed by: INTERNAL MEDICINE

## 2024-01-04 PROCEDURE — 81003 URINALYSIS AUTO W/O SCOPE: CPT | Performed by: INTERNAL MEDICINE

## 2024-01-04 PROCEDURE — G0439 PPPS, SUBSEQ VISIT: HCPCS | Performed by: INTERNAL MEDICINE

## 2024-01-04 PROCEDURE — 80053 COMPREHEN METABOLIC PANEL: CPT | Performed by: INTERNAL MEDICINE

## 2024-01-04 PROCEDURE — 85027 COMPLETE CBC AUTOMATED: CPT | Performed by: INTERNAL MEDICINE

## 2024-01-04 PROCEDURE — 84550 ASSAY OF BLOOD/URIC ACID: CPT | Performed by: INTERNAL MEDICINE

## 2024-01-04 PROCEDURE — 82306 VITAMIN D 25 HYDROXY: CPT | Performed by: INTERNAL MEDICINE

## 2024-01-04 PROCEDURE — 99214 OFFICE O/P EST MOD 30 MIN: CPT | Mod: 25 | Performed by: INTERNAL MEDICINE

## 2024-01-04 PROCEDURE — 36415 COLL VENOUS BLD VENIPUNCTURE: CPT | Performed by: INTERNAL MEDICINE

## 2024-01-04 RX ORDER — ALLOPURINOL 100 MG/1
200 TABLET ORAL DAILY
Qty: 180 TABLET | Refills: 3 | Status: SHIPPED | OUTPATIENT
Start: 2024-01-04

## 2024-01-04 RX ORDER — FENOFIBRATE 160 MG/1
160 TABLET ORAL DAILY
Qty: 90 TABLET | Refills: 3 | Status: SHIPPED | OUTPATIENT
Start: 2024-01-04

## 2024-01-04 RX ORDER — SIMVASTATIN 20 MG
20 TABLET ORAL DAILY
Qty: 90 TABLET | Refills: 3 | Status: SHIPPED | OUTPATIENT
Start: 2024-01-04

## 2024-01-04 RX ORDER — LOSARTAN POTASSIUM 100 MG/1
100 TABLET ORAL DAILY
Qty: 90 TABLET | Refills: 3 | Status: SHIPPED | OUTPATIENT
Start: 2024-01-04

## 2024-01-04 RX ORDER — SILDENAFIL 100 MG/1
100 TABLET, FILM COATED ORAL DAILY PRN
Qty: 12 TABLET | Refills: 3 | Status: SHIPPED | OUTPATIENT
Start: 2024-01-04

## 2024-01-04 RX ORDER — AMLODIPINE BESYLATE 5 MG/1
5 TABLET ORAL DAILY
Qty: 90 TABLET | Refills: 3 | Status: SHIPPED | OUTPATIENT
Start: 2024-01-04

## 2024-01-04 ASSESSMENT — ENCOUNTER SYMPTOMS
EYE PAIN: 0
NERVOUS/ANXIOUS: 0
ARTHRALGIAS: 0
DIZZINESS: 0
ABDOMINAL PAIN: 0
NAUSEA: 0
WEAKNESS: 0
SHORTNESS OF BREATH: 0
SORE THROAT: 0
DYSURIA: 0
HEMATURIA: 0
PALPITATIONS: 0
JOINT SWELLING: 0
CONSTIPATION: 0
HEADACHES: 0
CHILLS: 0
COUGH: 0
FEVER: 0
HEARTBURN: 0
PARESTHESIAS: 0
HEMATOCHEZIA: 0
FREQUENCY: 1
MYALGIAS: 0
DIARRHEA: 0

## 2024-01-04 ASSESSMENT — ACTIVITIES OF DAILY LIVING (ADL): CURRENT_FUNCTION: NO ASSISTANCE NEEDED

## 2024-01-04 NOTE — PROGRESS NOTES
"SUBJECTIVE:   Charles is a 73 year old, presenting for the followin-year-old male here for his annual wellness visit and to follow-up medical problems including hypertension, hypercholesterolemia, history of gout, and a few other concerns.  See assessment and plan for details.    Physical (AWV, fasting)        2024    10:08 AM   Additional Questions   Roomed by Barby LÓPEZ       Are you in the first 12 months of your Medicare coverage?  No    Healthy Habits:     In general, how would you rate your overall health?  Good    Frequency of exercise:  2-3 days/week    Duration of exercise:  15-30 minutes    Do you usually eat at least 4 servings of fruit and vegetables a day, include whole grains    & fiber and avoid regularly eating high fat or \"junk\" foods?  Yes    Taking medications regularly:  Yes    Medication side effects:  None    Ability to successfully perform activities of daily living:  No assistance needed    Home Safety:  No safety concerns identified    Hearing Impairment:  No hearing concerns    In the past 6 months, have you been bothered by leaking of urine?  No    In general, how would you rate your overall mental or emotional health?  Good      Today's PHQ-2 Score:       2024     9:58 AM   PHQ-2 (  [employer])   Q1: Little interest or pleasure in doing things 0   Q2: Feeling down, depressed or hopeless 0   PHQ-2 Score 0   Q1: Little interest or pleasure in doing things Not at all   Q2: Feeling down, depressed or hopeless Not at all   PHQ-2 Score 0           Have you ever done Advance Care Planning? (For example, a Health Directive, POLST, or a discussion with a medical provider or your loved ones about your wishes): No, advance care planning information given to patient to review.  Advanced care planning was discussed at today's visit.       Fall risk  Fallen 2 or more times in the past year?: No  Any fall with injury in the past year?: No    Cognitive Screening   1) Repeat 3 items (Leader, " Season, Table)    2) Clock draw: NORMAL  3) 3 item recall: Recalls 3 objects  Results: 3 items recalled: COGNITIVE IMPAIRMENT LESS LIKELY    Mini-CogTM Copyright KOSTAS Castañeda. Licensed by the author for use in [facility]; reprinted with permission ([email or web address]). All rights reserved.      Do you have sleep apnea, excessive snoring or daytime drowsiness? : no    Reviewed and updated as needed this visit by clinical staff   Tobacco  Allergies  Meds              Reviewed and updated as needed this visit by Provider                 Social History     Tobacco Use    Smoking status: Former     Packs/day: 1.00     Years: 40.00     Additional pack years: 0.00     Total pack years: 40.00     Types: Cigarettes     Quit date: 2015     Years since quittin.3     Passive exposure: Past    Smokeless tobacco: Never    Tobacco comments:     Quit    Substance Use Topics    Alcohol use: Yes     Alcohol/week: 14.0 standard drinks of alcohol     Types: 14 Standard drinks or equivalent per week     Comment: 2-3/day             2023     1:49 PM   Alcohol Use   Prescreen: >3 drinks/day or >7 drinks/week? Yes   AUDIT SCORE  8     Do you have a current opioid prescription? No  Do you use any other controlled substances or medications that are not prescribed by a provider? Alcohol              Current providers sharing in care for this patient include:   Patient Care Team:  Kalpesh Aldrich MD as PCP - General (Internal Medicine)  Kalpesh Aldrich MD as Assigned PCP    The following health maintenance items are reviewed in Epic and correct as of today:  Health Maintenance   Topic Date Due    ANNUAL REVIEW OF HM ORDERS  2024    MEDICARE ANNUAL WELLNESS VISIT  2024    LUNG CANCER SCREENING  2024    FALL RISK ASSESSMENT  2025    LIPID  2028    ADVANCE CARE PLANNING  2028    COLORECTAL CANCER SCREENING  2028    DTAP/TDAP/TD IMMUNIZATION (4 - Td or Tdap) 2033     "HEPATITIS C SCREENING  Completed    PHQ-2 (once per calendar year)  Completed    INFLUENZA VACCINE  Completed    Pneumococcal Vaccine: 65+ Years  Completed    ZOSTER IMMUNIZATION  Completed    RSV VACCINE (Pregnancy & 60+)  Completed    AORTIC ANEURYSM SCREENING (SYSTEM ASSIGNED)  Completed    COVID-19 Vaccine  Completed    IPV IMMUNIZATION  Aged Out    HPV IMMUNIZATION  Aged Out    MENINGITIS IMMUNIZATION  Aged Out    RSV MONOCLONAL ANTIBODY  Aged Out     Lab work is in process          Review of Systems   Constitutional:  Negative for chills and fever.   HENT:  Negative for congestion, ear pain, hearing loss and sore throat.    Eyes:  Negative for pain and visual disturbance.   Respiratory:  Negative for cough and shortness of breath.    Cardiovascular:  Negative for chest pain, palpitations and peripheral edema.   Gastrointestinal:  Negative for abdominal pain, constipation, diarrhea, heartburn, hematochezia and nausea.   Genitourinary:  Positive for frequency and impotence. Negative for dysuria, genital sores, hematuria, penile discharge and urgency.   Musculoskeletal:  Negative for arthralgias, joint swelling and myalgias.   Skin:  Negative for rash.   Neurological:  Negative for dizziness, weakness, headaches and paresthesias.   Psychiatric/Behavioral:  Negative for mood changes. The patient is not nervous/anxious.          OBJECTIVE:   /78 (BP Location: Right arm, Patient Position: Sitting, Cuff Size: Adult Regular)   Pulse 97   Temp 97.7  F (36.5  C) (Oral)   Resp 16   Ht 1.791 m (5' 10.5\")   Wt 93 kg (205 lb)   SpO2 96%   BMI 29.00 kg/m   Estimated body mass index is 29 kg/m  as calculated from the following:    Height as of this encounter: 1.791 m (5' 10.5\").    Weight as of this encounter: 93 kg (205 lb).  Physical Exam  EYES: Eyelids, conjunctiva, and sclera were normal. Pupils were normal. Cornea, iris, and lens were normal bilaterally.  HEAD, EARS, NOSE, MOUTH, AND THROAT: Head and face " were normal. TMs and external auditory canals are normal.  Oropharynx normal  NECK: Neck appearance was normal. There were no neck masses and the thyroid was not enlarged and no nodules are felt.  No lymphadenopathy.  RESPIRATORY: Breathing pattern was normal and the chest moved symmetrically.   Lung sounds were normal and there were no rales or wheezes.  CARDIOVASCULAR: Heart rate and rhythm were normal.  S1 and S2 were normal and there were no extra sounds or murmurs. Peripheral pulses in arms and legs were normal.  There was no peripheral edema.  No carotid bruits.  GASTROINTESTINAL:  Bowel sounds were present.   Palpation detected no tenderness, mass, or enlarged organs.   RECTAL/PROSTATE: Deferred   MUSCULOSKELETAL: Skeletal configuration was normal and muscle mass was normal for age. Joint appearance was overall normal.  LYMPHATIC: There were no enlarged nodes.  SKIN/HAIR/NAILS: Skin color was normal.  There were no concerning skin lesions.  NEUROLOGIC: The patient was alert and oriented to person, place, time, and circumstance. Speech was normal. Cranial nerves were normal. Motor strength was normal for age. The patient was normally coordinated.  Sensation intact.  PSYCHIATRIC:  Mood and affect were normal and the patient had normal recent and remote memory. The patient's judgment and insight were normal.         ASSESSMENT / PLAN:   1. Encounter for Medicare annual wellness exam  Immunizations are reviewed and everything is up-to-date.  Living will discussed.  Former smoker quitting in 2015.  Discussed using alcohol in moderation.  Regular exercise and good diet habits to maintain a healthy weight discussed.  Up to date with colonoscopies and this should be repeated in 2028.  Prostate exam is deferred but I will check a PSA for prostate cancer screening.  Dementia and depression screening completed.  He is getting an annual eye exam completed.  Seeing a dentist every 6 months recommended.  Skin exam  performed and recommending regular use of sunblock.  He sees a dermatologist every year.  Hepatitis C antibody for screening was normal.  Will screen for diabetes with fasting glucose.  No AAA on imaging at age 65.  Recommending abdominal ultrasound for screening now that he is over 65 and it has been 8 years since last imaging completed    2. Primary hypertension  Good blood pressure control with combination of amlodipine and losartan.  Tolerating medications without side effects.  - losartan (COZAAR) 100 MG tablet; Take 1 tablet (100 mg) by mouth daily  Dispense: 90 tablet; Refill: 3  - amLODIPine (NORVASC) 5 MG tablet; Take 1 tablet (5 mg) by mouth daily  Dispense: 90 tablet; Refill: 3  - CBC with platelets; Future  - Comprehensive metabolic panel (BMP + Alb, Alk Phos, ALT, AST, Total. Bili, TP); Future  - UA Macroscopic with reflex to Microscopic and Culture - Lab Collect; Future    3. Mixed hyperlipidemia  Recheck lipid profile taking combination of simvastatin and fenofibrate.  Tolerating this combination without any side effects.  - fenofibrate (TRIGLIDE/LOFIBRA) 160 MG tablet; Take 1 tablet (160 mg) by mouth daily  Dispense: 90 tablet; Refill: 3  - simvastatin (ZOCOR) 20 MG tablet; Take 1 tablet (20 mg) by mouth daily  Dispense: 90 tablet; Refill: 3  - Lipid Profile (Chol, Trig, HDL, LDL calc); Future    4. Tobacco abuse, in remission  Former smoker with over 30-pack-year history.  Quit 2015.  Continue annual low-dose CT scan recently completed December 2023 showing no new nodules and no change in previous nodules.    5. Pulmonary nodule  Enlarging pulmonary nodule resected in 2015.  Pathology was benign.  Continue annual screening as above    6. Erectile dysfunction, unspecified erectile dysfunction type  He wishes to retry Viagra rather than Cialis  - sildenafil (VIAGRA) 100 MG tablet; Take 1 tablet (100 mg) by mouth daily as needed (ED)  Dispense: 12 tablet; Refill: 3    7. History of gout  No gout  "attacks and continues on allopurinol.  Monitor uric acid  - allopurinol (ZYLOPRIM) 100 MG tablet; Take 2 tablets (200 mg) by mouth daily  Dispense: 180 tablet; Refill: 3  - Uric acid; Future    8. Vitamin D deficiency  Recheck vitamin D level  - Vitamin D deficiency screening; Future    9. Nocturia  Nocturia likely related to BPH.  Cutting back on alcohol would likely improve symptoms.  He has not interested in medication at this time    10. Sebaceous cyst  Sebaceous cyst versus lipoma.  Has been uncomfortable the last few days.  Will monitor.    11. Screening for prostate cancer  Exam deferred but will check PSA annually for prostate cancer screening  - PSA, screen; Future    12. Personal history of colonic polyps  Colonoscopy recently completed December 2023 with 3 mm adenomatous polyp removed.  Will plan to repeat every 5 years    Patient has been advised of split billing requirements and indicates understanding: Yes          BMI:   Estimated body mass index is 29 kg/m  as calculated from the following:    Height as of this encounter: 1.791 m (5' 10.5\").    Weight as of this encounter: 93 kg (205 lb).         He reports that he quit smoking about 8 years ago. His smoking use included cigarettes. He has a 40 pack-year smoking history. He has been exposed to tobacco smoke. He has never used smokeless tobacco.      Appropriate preventive services were discussed with this patient, including applicable screening as appropriate for fall prevention, nutrition, physical activity, Tobacco-use cessation, weight loss and cognition.  Checklist reviewing preventive services available has been given to the patient.    Reviewed patients plan of care and provided an AVS. The Basic Care Plan (routine screening as documented in Health Maintenance) for Charles meets the Care Plan requirement. This Care Plan has been established and reviewed with the Patient.          Kalpesh Aldrich MD  [facility] " JASWINDER    Identified Health Risks:  I have reviewed Opioid Use Disorder and Substance Use Disorder risk factors and made any needed referrals. The patient reports that he drinks more than one alcoholic drink per day but denies binge or excessive drinking. He was counseled and given information about possible harmful effects of excessive alcohol intake.

## 2024-01-04 NOTE — PATIENT INSTRUCTIONS
Annual flu shot every fall     Colonoscopy every 5 years due again in 2028     Continue to see your dermatologist every year     Annual eye exam     Low-dose CT scan annually for lung cancer screening due again December 2024     Patient Education   Personalized Prevention Plan  You are due for the preventive services outlined below.  Your care team is available to assist you in scheduling these services.  If you have already completed any of these items, please share that information with your care team to update in your medical record.  There are no preventive care reminders to display for this patient.        Patient Education   Alcohol Use     Many people can enjoy a glass of wine or beer without any negative consequences to their health. According to the Centers for Disease Control and Prevention (CDC), having one or fewer drinks per day for women and two or fewer per day for men is considered moderate drinking.     When people drink more than moderately, it can become concerning. Excessive drinking is defined as consuming 15 drinks or more per week for men and 8 drinks or more per week for women. There are various health problems associated with excessive drinking, which include:  Damage to vital organs like the heart, brain, liver and pancreas  Harm to the digestive tract  Weaken the immune system  Higher risk for heart disease and cancer    There are many resources available to people who are addicted to alcohol. A counselor or other health care provider can give you support. So can a , , or rabbi who is trained in substance abuse counseling. Friends and family may also help once you are connected with professionals.

## 2024-01-05 DIAGNOSIS — E83.52 HYPERCALCEMIA: Primary | ICD-10-CM

## 2024-01-09 ENCOUNTER — HOSPITAL ENCOUNTER (OUTPATIENT)
Dept: ULTRASOUND IMAGING | Facility: CLINIC | Age: 74
Discharge: HOME | End: 2024-01-09
Attending: INTERNAL MEDICINE | Admitting: INTERNAL MEDICINE
Payer: MEDICARE

## 2024-01-09 DIAGNOSIS — Z87.891 PERSONAL HISTORY OF TOBACCO USE: ICD-10-CM

## 2024-01-09 DIAGNOSIS — Z00.00 ENCOUNTER FOR MEDICARE ANNUAL WELLNESS EXAM: ICD-10-CM

## 2024-01-09 PROBLEM — I71.43 INFRARENAL ABDOMINAL AORTIC ANEURYSM (AAA) WITHOUT RUPTURE (H): Status: ACTIVE | Noted: 2024-01-09

## 2024-01-09 PROCEDURE — 76706 US ABDL AORTA SCREEN AAA: CPT

## 2024-04-23 ENCOUNTER — TRANSFERRED RECORDS (OUTPATIENT)
Dept: HEALTH INFORMATION MANAGEMENT | Facility: CLINIC | Age: 74
End: 2024-04-23
Payer: MEDICARE

## 2024-06-24 ENCOUNTER — MYC MEDICAL ADVICE (OUTPATIENT)
Dept: INTERNAL MEDICINE | Facility: CLINIC | Age: 74
End: 2024-06-24

## 2024-08-29 ENCOUNTER — TRANSFERRED RECORDS (OUTPATIENT)
Dept: HEALTH INFORMATION MANAGEMENT | Facility: CLINIC | Age: 74
End: 2024-08-29
Payer: MEDICARE

## 2024-09-23 ENCOUNTER — TRANSFERRED RECORDS (OUTPATIENT)
Dept: HEALTH INFORMATION MANAGEMENT | Facility: CLINIC | Age: 74
End: 2024-09-23
Payer: MEDICARE

## 2024-10-01 PROBLEM — Z86.0100 PERSONAL HISTORY OF COLON POLYPS, UNSPECIFIED: Status: ACTIVE | Noted: 2023-12-15

## 2024-11-19 ENCOUNTER — TELEPHONE (OUTPATIENT)
Dept: INTERNAL MEDICINE | Facility: CLINIC | Age: 74
End: 2024-11-19
Payer: MEDICARE

## 2024-11-19 ENCOUNTER — MYC MEDICAL ADVICE (OUTPATIENT)
Dept: INTERNAL MEDICINE | Facility: CLINIC | Age: 74
End: 2024-11-19
Payer: MEDICARE

## 2024-11-19 DIAGNOSIS — F17.201 TOBACCO ABUSE, IN REMISSION: ICD-10-CM

## 2024-11-19 DIAGNOSIS — Z87.891 PERSONAL HISTORY OF TOBACCO USE, PRESENTING HAZARDS TO HEALTH: ICD-10-CM

## 2024-11-19 NOTE — TELEPHONE ENCOUNTER
Imaging Referral Request    Who is requesting: Pt    Orders being requested: CT Scan Chest/Lung before AWV on 1/6/25 (and after 12/18/24, since that was Pt's last CT scan)    Reason service is needed/diagnosis: Lung cancer screening    When are orders needed by: ASAP    Has this been discussed with Provider: Yes    Does patient have a preference on a Group/Provider/Facility? [facility]    Does patient have an appointment scheduled?: Yes: 1/6/24    Where to send orders: Place in Epic    Could we send this information to you in [employer] or would you prefer to receive a phone call?:   Patient would prefer a ByteLight message.

## 2024-11-20 DIAGNOSIS — Z87.891 PERSONAL HISTORY OF TOBACCO USE: Primary | ICD-10-CM

## 2024-12-19 ENCOUNTER — HOSPITAL ENCOUNTER (OUTPATIENT)
Dept: CT IMAGING | Facility: CLINIC | Age: 74
Discharge: HOME | End: 2024-12-19
Attending: INTERNAL MEDICINE
Payer: MEDICARE

## 2024-12-19 DIAGNOSIS — Z87.891 PERSONAL HISTORY OF TOBACCO USE: ICD-10-CM

## 2024-12-19 PROCEDURE — 71271 CT THORAX LUNG CANCER SCR C-: CPT

## 2025-01-01 SDOH — HEALTH STABILITY: PHYSICAL HEALTH: ON AVERAGE, HOW MANY DAYS PER WEEK DO YOU ENGAGE IN MODERATE TO STRENUOUS EXERCISE (LIKE A BRISK WALK)?: 3 DAYS

## 2025-01-01 SDOH — HEALTH STABILITY: PHYSICAL HEALTH: ON AVERAGE, HOW MANY MINUTES DO YOU ENGAGE IN EXERCISE AT THIS LEVEL?: 30 MIN

## 2025-01-01 ASSESSMENT — SOCIAL DETERMINANTS OF HEALTH (SDOH): HOW OFTEN DO YOU GET TOGETHER WITH FRIENDS OR RELATIVES?: ONCE A WEEK

## 2025-01-06 ENCOUNTER — OFFICE VISIT (OUTPATIENT)
Dept: INTERNAL MEDICINE | Facility: CLINIC | Age: 75
End: 2025-01-06
Payer: MEDICARE

## 2025-01-06 VITALS
HEART RATE: 97 BPM | TEMPERATURE: 98.1 F | OXYGEN SATURATION: 98 % | RESPIRATION RATE: 16 BRPM | WEIGHT: 219 LBS | BODY MASS INDEX: 31.35 KG/M2 | HEIGHT: 70 IN | DIASTOLIC BLOOD PRESSURE: 76 MMHG | SYSTOLIC BLOOD PRESSURE: 126 MMHG

## 2025-01-06 DIAGNOSIS — E83.52 HYPERCALCEMIA: ICD-10-CM

## 2025-01-06 DIAGNOSIS — Z86.0100 PERSONAL HISTORY OF COLON POLYPS, UNSPECIFIED: ICD-10-CM

## 2025-01-06 DIAGNOSIS — E78.2 MIXED HYPERLIPIDEMIA: Chronic | ICD-10-CM

## 2025-01-06 DIAGNOSIS — F17.201 TOBACCO ABUSE, IN REMISSION: ICD-10-CM

## 2025-01-06 DIAGNOSIS — I10 PRIMARY HYPERTENSION: ICD-10-CM

## 2025-01-06 DIAGNOSIS — Z12.5 SCREENING FOR PROSTATE CANCER: ICD-10-CM

## 2025-01-06 DIAGNOSIS — M81.0 OSTEOPOROSIS WITHOUT CURRENT PATHOLOGICAL FRACTURE, UNSPECIFIED OSTEOPOROSIS TYPE: ICD-10-CM

## 2025-01-06 DIAGNOSIS — I71.43 INFRARENAL ABDOMINAL AORTIC ANEURYSM (AAA) WITHOUT RUPTURE: ICD-10-CM

## 2025-01-06 DIAGNOSIS — E66.811 CLASS 1 OBESITY DUE TO EXCESS CALORIES WITH SERIOUS COMORBIDITY AND BODY MASS INDEX (BMI) OF 31.0 TO 31.9 IN ADULT: ICD-10-CM

## 2025-01-06 DIAGNOSIS — Z87.39 HISTORY OF GOUT: ICD-10-CM

## 2025-01-06 DIAGNOSIS — B02.9 HERPES ZOSTER WITHOUT COMPLICATION: ICD-10-CM

## 2025-01-06 DIAGNOSIS — K11.8 MASS OF LEFT PAROTID GLAND: ICD-10-CM

## 2025-01-06 DIAGNOSIS — Z00.00 ENCOUNTER FOR MEDICARE ANNUAL WELLNESS EXAM: Primary | ICD-10-CM

## 2025-01-06 DIAGNOSIS — N52.9 ERECTILE DYSFUNCTION, UNSPECIFIED ERECTILE DYSFUNCTION TYPE: ICD-10-CM

## 2025-01-06 DIAGNOSIS — E55.9 VITAMIN D DEFICIENCY: ICD-10-CM

## 2025-01-06 DIAGNOSIS — E66.09 CLASS 1 OBESITY DUE TO EXCESS CALORIES WITH SERIOUS COMORBIDITY AND BODY MASS INDEX (BMI) OF 31.0 TO 31.9 IN ADULT: ICD-10-CM

## 2025-01-06 PROBLEM — R91.1 PULMONARY NODULE: Status: RESOLVED | Noted: 2023-12-19 | Resolved: 2025-01-06

## 2025-01-06 LAB
ALBUMIN SERPL BCG-MCNC: 4.6 G/DL (ref 3.5–5.2)
ALBUMIN UR-MCNC: 30 MG/DL
ALP SERPL-CCNC: 70 U/L (ref 40–150)
ALT SERPL W P-5'-P-CCNC: 33 U/L (ref 0–70)
ANION GAP SERPL CALCULATED.3IONS-SCNC: 15 MMOL/L (ref 7–15)
APPEARANCE UR: CLEAR
AST SERPL W P-5'-P-CCNC: 33 U/L (ref 0–45)
BACTERIA #/AREA URNS HPF: ABNORMAL /HPF
BILIRUB SERPL-MCNC: 0.6 MG/DL
BILIRUB UR QL STRIP: NEGATIVE
BUN SERPL-MCNC: 21.7 MG/DL (ref 8–23)
CALCIUM SERPL-MCNC: 10.9 MG/DL (ref 8.8–10.4)
CHLORIDE SERPL-SCNC: 102 MMOL/L (ref 98–107)
CHOLEST SERPL-MCNC: 193 MG/DL
COLOR UR AUTO: YELLOW
CREAT SERPL-MCNC: 1.31 MG/DL (ref 0.67–1.17)
EGFRCR SERPLBLD CKD-EPI 2021: 58 ML/MIN/1.73M2
ERYTHROCYTE [DISTWIDTH] IN BLOOD BY AUTOMATED COUNT: 11.9 % (ref 10–15)
FASTING STATUS PATIENT QL REPORTED: ABNORMAL
FASTING STATUS PATIENT QL REPORTED: ABNORMAL
GLUCOSE SERPL-MCNC: 120 MG/DL (ref 70–99)
GLUCOSE UR STRIP-MCNC: NEGATIVE MG/DL
GRAN CASTS #/AREA URNS LPF: ABNORMAL /LPF
HCO3 SERPL-SCNC: 22 MMOL/L (ref 22–29)
HCT VFR BLD AUTO: 44.5 % (ref 40–53)
HDLC SERPL-MCNC: 67 MG/DL
HGB BLD-MCNC: 15.2 G/DL (ref 13.3–17.7)
HGB UR QL STRIP: ABNORMAL
HYALINE CASTS #/AREA URNS LPF: ABNORMAL /LPF
KETONES UR STRIP-MCNC: NEGATIVE MG/DL
LDLC SERPL CALC-MCNC: 89 MG/DL
LEUKOCYTE ESTERASE UR QL STRIP: NEGATIVE
MCH RBC QN AUTO: 32.3 PG (ref 26.5–33)
MCHC RBC AUTO-ENTMCNC: 34.2 G/DL (ref 31.5–36.5)
MCV RBC AUTO: 95 FL (ref 78–100)
MUCOUS THREADS #/AREA URNS LPF: PRESENT /LPF
NITRATE UR QL: NEGATIVE
NONHDLC SERPL-MCNC: 126 MG/DL
PH UR STRIP: 7 [PH] (ref 5–8)
PLATELET # BLD AUTO: 228 10E3/UL (ref 150–450)
POTASSIUM SERPL-SCNC: 4.1 MMOL/L (ref 3.4–5.3)
PROT SERPL-MCNC: 7.4 G/DL (ref 6.4–8.3)
PSA SERPL DL<=0.01 NG/ML-MCNC: 1.27 NG/ML (ref 0–6.5)
PTH-INTACT SERPL-MCNC: 37 PG/ML (ref 15–65)
RBC # BLD AUTO: 4.71 10E6/UL (ref 4.4–5.9)
RBC #/AREA URNS AUTO: ABNORMAL /HPF
SODIUM SERPL-SCNC: 139 MMOL/L (ref 135–145)
SP GR UR STRIP: 1.01 (ref 1–1.03)
SQUAMOUS #/AREA URNS AUTO: ABNORMAL /LPF
TRIGL SERPL-MCNC: 184 MG/DL
TSH SERPL DL<=0.005 MIU/L-ACNC: 2 UIU/ML (ref 0.3–4.2)
URATE SERPL-MCNC: 4.3 MG/DL (ref 3.4–7)
UROBILINOGEN UR STRIP-ACNC: 0.2 E.U./DL
VIT D+METAB SERPL-MCNC: 55 NG/ML (ref 20–50)
WBC # BLD AUTO: 7.1 10E3/UL (ref 4–11)
WBC #/AREA URNS AUTO: ABNORMAL /HPF
WBC CASTS #/AREA URNS LPF: ABNORMAL /LPF

## 2025-01-06 PROCEDURE — 99214 OFFICE O/P EST MOD 30 MIN: CPT | Mod: 25 | Performed by: INTERNAL MEDICINE

## 2025-01-06 PROCEDURE — 84443 ASSAY THYROID STIM HORMONE: CPT | Performed by: INTERNAL MEDICINE

## 2025-01-06 PROCEDURE — 83036 HEMOGLOBIN GLYCOSYLATED A1C: CPT | Performed by: INTERNAL MEDICINE

## 2025-01-06 PROCEDURE — G0103 PSA SCREENING: HCPCS | Performed by: INTERNAL MEDICINE

## 2025-01-06 PROCEDURE — 83970 ASSAY OF PARATHORMONE: CPT | Performed by: INTERNAL MEDICINE

## 2025-01-06 PROCEDURE — 84550 ASSAY OF BLOOD/URIC ACID: CPT | Performed by: INTERNAL MEDICINE

## 2025-01-06 PROCEDURE — 81001 URINALYSIS AUTO W/SCOPE: CPT | Performed by: INTERNAL MEDICINE

## 2025-01-06 PROCEDURE — 36415 COLL VENOUS BLD VENIPUNCTURE: CPT | Performed by: INTERNAL MEDICINE

## 2025-01-06 PROCEDURE — 80053 COMPREHEN METABOLIC PANEL: CPT | Performed by: INTERNAL MEDICINE

## 2025-01-06 PROCEDURE — G0439 PPPS, SUBSEQ VISIT: HCPCS | Performed by: INTERNAL MEDICINE

## 2025-01-06 PROCEDURE — 85027 COMPLETE CBC AUTOMATED: CPT | Performed by: INTERNAL MEDICINE

## 2025-01-06 PROCEDURE — 80061 LIPID PANEL: CPT | Performed by: INTERNAL MEDICINE

## 2025-01-06 PROCEDURE — 82306 VITAMIN D 25 HYDROXY: CPT | Performed by: INTERNAL MEDICINE

## 2025-01-06 PROCEDURE — G2211 COMPLEX E/M VISIT ADD ON: HCPCS | Performed by: INTERNAL MEDICINE

## 2025-01-06 RX ORDER — FENOFIBRATE 160 MG/1
160 TABLET ORAL DAILY
Qty: 90 TABLET | Refills: 3 | Status: SHIPPED | OUTPATIENT
Start: 2025-01-06

## 2025-01-06 RX ORDER — SIMVASTATIN 20 MG
20 TABLET ORAL DAILY
Qty: 90 TABLET | Refills: 3 | Status: SHIPPED | OUTPATIENT
Start: 2025-01-06

## 2025-01-06 RX ORDER — ALLOPURINOL 100 MG/1
200 TABLET ORAL DAILY
Qty: 180 TABLET | Refills: 3 | Status: SHIPPED | OUTPATIENT
Start: 2025-01-06

## 2025-01-06 RX ORDER — LOSARTAN POTASSIUM 100 MG/1
100 TABLET ORAL DAILY
Qty: 90 TABLET | Refills: 3 | Status: SHIPPED | OUTPATIENT
Start: 2025-01-06

## 2025-01-06 RX ORDER — AMLODIPINE BESYLATE 5 MG/1
5 TABLET ORAL DAILY
Qty: 90 TABLET | Refills: 3 | Status: SHIPPED | OUTPATIENT
Start: 2025-01-06

## 2025-01-06 RX ORDER — SILDENAFIL 100 MG/1
100 TABLET, FILM COATED ORAL DAILY PRN
Qty: 12 TABLET | Refills: 3 | Status: SHIPPED | OUTPATIENT
Start: 2025-01-06

## 2025-01-06 NOTE — PROGRESS NOTES
Preventive Care Visit  [facility]  Kalpesh Aldrich MD, Internal Medicine  Jan 6, 2025      Assessment & Plan     Encounter for Medicare annual wellness exam  Immunizations are reviewed and everything is up-to-date.  He has a living will.  Former smoker quitting 9 years ago.  Discussed using alcohol in moderation.  Regular exercise and good diet habits to maintain a healthy weight discussed.  Up to date with colonoscopies and this should be repeated in 2028.  Prostate exam is deferred but I will check a PSA for prostate cancer screening.  Dementia and depression screening completed.  He is getting an annual eye exam completed.  Seeing a dentist every 6 months recommended.  Skin exam performed and recommending regular use of sunblock.  He sees a dermatologist every year.  Hepatitis C antibody for screening was normal.  Will screen for diabetes with fasting glucose.      Primary hypertension  Blood pressure is well-controlled with combination of losartan and amlodipine.  Tolerating medications without side effects.  Checking appropriate labs.  - amLODIPine (NORVASC) 5 MG tablet; Take 1 tablet (5 mg) by mouth daily.  - losartan (COZAAR) 100 MG tablet; Take 1 tablet (100 mg) by mouth daily.  - UA Macroscopic with reflex to Microscopic and Culture - Lab Collect; Future  - CBC with platelets; Future  - Comprehensive metabolic panel (BMP + Alb, Alk Phos, ALT, AST, Total. Bili, TP); Future  - TSH with free T4 reflex; Future    Mixed hyperlipidemia  Rechecking lipid profile taking combination of simvastatin and fenofibrate.  Tolerating this combination without side effects.  - fenofibrate (TRIGLIDE/LOFIBRA) 160 MG tablet; Take 1 tablet (160 mg) by mouth daily.  - simvastatin (ZOCOR) 20 MG tablet; Take 1 tablet (20 mg) by mouth daily.  - Lipid Profile (Chol, Trig, HDL, LDL calc); Future    Class 1 obesity due to excess calories with serious comorbidity and body mass index (BMI) of 31.0 to 31.9 in  adult  We discussed the importance of more regular exercise and diet modification    History of gout  No recurrent gout attacks using allopurinol.  Recheck uric acid.  - allopurinol (ZYLOPRIM) 100 MG tablet; Take 2 tablets (200 mg) by mouth daily.  - Uric acid; Future    Tobacco abuse, in remission  Former smoker quitting 9 years ago.  Continue annual low-dose CT scan recently completed December 2024 without new nodules.    Previously found to have enlarging pulmonary nodule with resection 2015 with benign pathology.    Erectile dysfunction, unspecified erectile dysfunction type  Refilling sildenafil  - sildenafil (VIAGRA) 100 MG tablet; Take 1 tablet (100 mg) by mouth daily as needed (ED).    Osteoporosis without current pathological fracture, unspecified osteoporosis type  Try to maintain good calcium and vitamin D intake    Mass of left parotid gland  Left parotid appears enlarged and will obtain ultrasound to further evaluate  - US Parotid; Future    Infrarenal abdominal aortic aneurysm (AAA) without rupture (H)  Abdominal aorta measured at 2.9 cm.  Maintain good blood pressure control.  Consider repeating imaging in 5-10 years    Vitamin D deficiency  Recheck vitamin D level on replacement  - Vitamin D deficiency screening; Future    Herpes zoster without complication  Episode of shingles this summer despite history of Shingrix.  Some residual symptoms on his finger but not interested in any topical treatments    Personal history of colon polyps, unspecified  Last colonoscopy in 2023 with recommendation to return in 5 years    Hypercalcemia  Recheck calcium and PTH  - Parathyroid Hormone Intact; Future    Screening for prostate cancer    - PSA, screen; Future    Patient has been advised of split billing requirements and indicates understanding: Yes    The longitudinal plan of care for the diagnosis(es)/condition(s) as documented were addressed during this visit. Due to the added complexity in care, I will  "continue to support Charles in the subsequent management and with ongoing continuity of care.     BMI  Estimated body mass index is 31 kg/m  as calculated from the following:    Height as of this encounter: 1.79 m (5' 10.47\").    Weight as of this encounter: 99.3 kg (219 lb).       Counseling  Appropriate preventive services were addressed with this patient via screening, questionnaire, or discussion as appropriate for fall prevention, nutrition, physical activity, Tobacco-use cessation, social engagement, weight loss and cognition.  Checklist reviewing preventive services available has been given to the patient.  Reviewed patient's diet, addressing concerns and/or questions.   He is at risk for lack of exercise and has been provided with information to increase physical activity for the benefit of his well-being.   The patient reports drinking more than 3 alcoholic drinks per day and/or more than 7 drhnks per week. The patient was counseled and given information about possible harmful effects of excessive alcohol intake.The patient was provided with written information regarding signs of hearing loss.       Follow-up in 1 year for annual wellness visit    Subjective   Charles is a 74 year old, presenting for the following: Here for his annual wellness visit and follow-up medical problems including hypertension, hyperlipidemia, history of gout, and multiple other issues.  See assessment and plan for details  Physical (AWV, fasting)        1/6/2025     9:52 AM   Additional Questions   Roomed by              Health Care Directive  Patient does not have a Health Care Directive: Patient states has Advance Directive and will bring in a copy to clinic.      1/1/2025   General Health   How would you rate your overall physical health? (!) FAIR   Feel stress (tense, anxious, or unable to sleep) Not at all         1/1/2025   Nutrition   Diet: Regular (no restrictions)         1/1/2025   Exercise   Days per week of " moderate/strenous exercise 3 days   Average minutes spent exercising at this level 30 min         1/1/2025   Social Factors   Frequency of gathering with friends or relatives Once a week   Worry food won't last until get money to buy more No    No   Food not last or not have enough money for food? No    No   Do you have housing? (Housing is defined as stable permanent housing and does not include staying ouside in a car, in a tent, in an abandoned building, in an overnight shelter, or couch-surfing.) Yes    Yes   Are you worried about losing your housing? No    No   Lack of transportation? No    No   Unable to get utilities (heat,electricity)? No    No       Multiple values from one day are sorted in reverse-chronological order         1/1/2025   Fall Risk   Fallen 2 or more times in the past year? No    No   Trouble with walking or balance? No    No       Multiple values from one day are sorted in reverse-chronological order          1/1/2025   Activities of Daily Living- Home Safety   Needs help with the following daily activites None of the above   Safety concerns in the home None of the above         1/1/2025   Dental   Dentist two times every year? Yes         1/1/2025   Hearing Screening   Hearing concerns? (!) IT'S HARD TO FOLLOW A CONVERSATION IN A NOISY RESTAURANT OR CROWDED ROOM.         1/1/2025   Driving Risk Screening   Patient/family members have concerns about driving No         1/1/2025   General Alertness/Fatigue Screening   Have you been more tired than usual lately? No         1/1/2025   Urinary Incontinence Screening   Bothered by leaking urine in past 6 months No         1/1/2025   TB Screening   Were you born outside of the [place]? No         Today's PHQ-2 Score:       1/5/2025    11:19 AM   PHQ-2 ( 1999 [employer])   Q1: Little interest or pleasure in doing things 0   Q2: Feeling down, depressed or hopeless 0   PHQ-2 Score 0    Q1: Little interest or pleasure in doing things Not at all   Q2: Feeling  down, depressed or hopeless Not at all   PHQ-2 Score 0       Patient-reported           2025   Substance Use   Alcohol more than 3/day or more than 7/wk Yes   How often do you have a drink containing alcohol 4 or more times a week   How many alcohol drinks on typical day 3 or 4   How often do you have 5+ drinks at one occasion Less than monthly   Audit 2/3 Score 2   How often not able to stop drinking once started Never   How often failed to do what normally expected Never   How often needed first drink in am after a heavy drinking session Never   How often feeling of guilt or remorse after drinking Never   How often unable to remember what happened the night before Never   Have you or someone else been injured because of your drinking No   Has anyone been concerned or suggested you cut down on drinking No   TOTAL SCORE - AUDIT 6   Do you have a current opioid prescription? No   How severe/bad is pain from 1 to 10? 0/10 (No Pain)   Do you use any other substances recreationally? No     Social History     Tobacco Use    Smoking status: Former     Current packs/day: 0.00     Average packs/day: 1 pack/day for 40.0 years (40.0 ttl pk-yrs)     Types: Cigarettes     Start date: 1975     Quit date: 2015     Years since quittin.3     Passive exposure: Past    Smokeless tobacco: Never    Tobacco comments:     Quit    Vaping Use    Vaping status: Never Used   Substance Use Topics    Alcohol use: Yes     Alcohol/week: 14.0 standard drinks of alcohol     Types: 14 Standard drinks or equivalent per week     Comment: 3-4/day    Drug use: No       ASCVD Risk   The 10-year ASCVD risk score (Jess QUISPE, et al., 2019) is: 22.6%    Values used to calculate the score:      Age: 74 years      Sex: Male      Is Non- : No      Diabetic: No      Tobacco smoker: No      Systolic Blood Pressure: 126 mmHg      Is BP treated: Yes      HDL Cholesterol: 50 mg/dL      Total Cholesterol: 188  mg/dL            Reviewed and updated as needed this visit by Provider                    Past Medical History:   Diagnosis Date    Acute pain of left knee 2019    Gout versus tendinitis    Anal fistula 2021    Chronic left shoulder pain 2021    Attributed to bursitis and improved with steroid injection    Class 1 obesity due to excess calories with serious comorbidity and body mass index (BMI) of 31.0 to 31.9 in adult 2025    Gout     Herpes zoster without complication 2025    Impaired fasting glucose 10/13/2016    Glucose 113 2016 hemoglobin A1c normal at 5.5%    Impetigo 10/23/2018    Staph infection associated with laceration underneath right nose    Infrarenal abdominal aortic aneurysm (AAA) without rupture (H) 2024    Ultrasound showing 2.9 cm infrarenal abdominal aneurysm.  Recheck in 5 years.    Lyme disease 2011    Osteoporosis without current pathological fracture     DEXA T score -2.0 but for trabecular bone score and recent recurrent stress fracture of distal femur, declines treatment with alendronate    Perirectal abscess 2023    Surgery x2 in     Personal history of colonic polyps     Colonoscopy 2018 without polyps.  Colonoscopy 2023 with 3 mm adenomatous polyp.  Repeat in 5 years    Pulmonary nodule 2015    Benign surgical resection 9/15, CT chest normal 2016, continue annually.  No change in nodules 2023    Reactive depression (situational) 10/13/2016    Brother  suddenly last month after complications from hip fracture    Right groin pain 10/21/2019    Risk of exposure to Lyme disease 10/21/2019    Screening for abdominal aortic aneurysm     CT scan normal  at age 65.  Consider ultrasound at age 72    Small bowel obstruction (H) 2015    Stress fracture of left femur with routine healing 10/21/2019    Tobacco abuse, in remission 10/13/2016    Benign pulmonary nodule resected , CT  "chest normal October 2016, CT November 2019 without pulmonary nodules, no nodules November 2020.  CT without nodules December 2021.  Stable CT December 2022 and December 2023 and December 2024     Past Surgical History:   Procedure Laterality Date    OTHER SURGICAL HISTORY Right 9/15    lung nodule resectionBenign pulmonary nodule     Current providers sharing in care for this patient include:  Patient Care Team:  Kalpesh Aldrich MD as PCP - General (Internal Medicine)  Kalpesh Aldrich MD as Assigned PCP    The following health maintenance items are reviewed in Epic and correct as of today:  Health Maintenance   Topic Date Due    BMP  01/04/2025    LIPID  01/04/2025    ANNUAL REVIEW OF HM ORDERS  01/04/2025    MEDICARE ANNUAL WELLNESS VISIT  01/04/2025    URIC ACID  01/04/2025    LUNG CANCER SCREENING  12/19/2025    FALL RISK ASSESSMENT  01/06/2026    GLUCOSE  01/04/2027    COLORECTAL CANCER SCREENING  12/13/2028    ADVANCE CARE PLANNING  01/04/2029    DTAP/TDAP/TD IMMUNIZATION (4 - Td or Tdap) 01/06/2033    HEPATITIS C SCREENING  Completed    PHQ-2 (once per calendar year)  Completed    INFLUENZA VACCINE  Completed    Pneumococcal Vaccine: 50+ Years  Completed    ZOSTER IMMUNIZATION  Completed    RSV VACCINE  Completed    AORTIC ANEURYSM SCREENING (SYSTEM ASSIGNED)  Completed    COVID-19 Vaccine  Completed    HPV IMMUNIZATION  Aged Out    MENINGITIS IMMUNIZATION  Aged Out    RSV MONOCLONAL ANTIBODY  Aged Out         Review of Systems  Constitutional, HEENT, cardiovascular, pulmonary, GI, , musculoskeletal, neuro, skin, endocrine and psych systems are negative, except as otherwise noted.     Objective    Exam  /76 (BP Location: Right arm, Patient Position: Sitting, Cuff Size: Adult Regular)   Pulse 97   Temp 98.1  F (36.7  C) (Oral)   Resp 16   Ht 1.79 m (5' 10.47\")   Wt 99.3 kg (219 lb)   SpO2 98%   BMI 31.00 kg/m     Estimated body mass index is 31 kg/m  as calculated from the following:    " "Height as of this encounter: 1.79 m (5' 10.47\").    Weight as of this encounter: 99.3 kg (219 lb).    Physical Exam  EYES: Eyelids, conjunctiva, and sclera were normal. Pupils were normal. Cornea, iris, and lens were normal bilaterally.  HEAD, EARS, NOSE, MOUTH, AND THROAT: Head and face were normal. TMs and external auditory canals are normal.  Oropharynx normal  NECK: Neck appearance was normal.  Enlarged left parotid.  No cervical lymphadenopathy.  No thyromegaly.  No lymphadenopathy.  RESPIRATORY: Breathing pattern was normal and the chest moved symmetrically.   Lung sounds were normal and there were no rales or wheezes.  CARDIOVASCULAR: Heart rate and rhythm were normal.  S1 and S2 were normal and there were no extra sounds or murmurs. Peripheral pulses in arms and legs were normal.  There was no peripheral edema.  No carotid bruits.  GASTROINTESTINAL:  Bowel sounds were present.   Palpation detected no tenderness, mass, or enlarged organs.   MUSCULOSKELETAL: Skeletal configuration was normal and muscle mass was normal for age. Joint appearance was overall normal.  LYMPHATIC: There were no enlarged nodes.  SKIN/HAIR/NAILS: Skin color was normal.  There were no concerning skin lesions.  NEUROLOGIC: The patient was alert and oriented to person, place, time, and circumstance. Speech was normal. Cranial nerves were normal. Motor strength was normal for age. The patient was normally coordinated.  Sensation intact.  PSYCHIATRIC:  Mood and affect were normal and the patient had normal recent and remote memory. The patient's judgment and insight were normal.         1/6/2025   Mini Cog   Clock Draw Score 2 Normal   3 Item Recall 3 objects recalled   Mini Cog Total Score 5              Signed Electronically by: Kalpesh Aldrich MD    "

## 2025-01-06 NOTE — PATIENT INSTRUCTIONS
Patient Education   Preventive Care Advice   This is general advice given by our system to help you stay healthy. However, your care team may have specific advice just for you. Please talk to your care team about your preventive care needs.  Nutrition  Eat 5 or more servings of fruits and vegetables each day.  Try wheat bread, brown rice and whole grain pasta (instead of white bread, rice, and pasta).  Get enough calcium and vitamin D. Check the label on foods and aim for 100% of the RDA (recommended daily allowance).  Lifestyle  Exercise at least 150 minutes each week  (30 minutes a day, 5 days a week).  Do muscle strengthening activities 2 days a week. These help control your weight and prevent disease.  No smoking.  Wear sunscreen to prevent skin cancer.  Continue to see your dermatologist every year  Have a dental exam and cleaning every 6 months.  Annual eye exam  Yearly exams  See your health care team every year to talk about:  Any changes in your health.  Any medicines your care team has prescribed.  Preventive care, family planning, and ways to prevent chronic diseases.  Shots (vaccines)   COVID-19 shot: Completed  Flu shot: Get a flu shot every year.  Tetanus shot: Get a tetanus shot every 10 years.  Pneumococcal and RSV vaccines completed  Shingles shot (for age 50 and up).  Completed  General health tests  Diabetes screening: Annually  Cholesterol test: Annually  Hepatitis C: Get tested at least once in your life.  Cancer screening tests  Colon cancer screening: It is important to start screening for colon cancer at age 45.  Colonoscopy should be repeated in 2028  Prostate cancer screening test: Annual PSA  Lung cancer screening: Annual low-dose CT scan for lung cancer screening  For informational purposes only. Not to replace the advice of your health care provider. Copyright   2023 [employer]. All rights reserved. Clinically reviewed by the [facility].  [employer] [id number] - REV 01/24.  Hearing Loss: Care Instructions  Overview     Hearing loss is a sudden or slow decrease in how well you hear. It can range from slight to profound. Permanent hearing loss can occur with aging. It also can happen when you are exposed long-term to loud noise. Examples include listening to loud music, riding motorcycles, or being around other loud machines.  Hearing loss can affect your work and home life. It can make you feel lonely or depressed. You may feel that you have lost your independence. But hearing aids and other devices can help you hear better and feel connected to others.  Follow-up care is a key part of your treatment and safety. Be sure to make and go to all appointments, and call your doctor if you are having problems. It's also a good idea to know your test results and keep a list of the medicines you take.  How can you care for yourself at home?  Avoid loud noises whenever possible. This helps keep your hearing from getting worse.  Always wear hearing protection around loud noises.  Wear a hearing aid as directed.  A professional can help you pick a hearing aid that will work best for you.  You can also get hearing aids over the counter for mild to moderate hearing loss.  Have hearing tests as your doctor suggests. They can show whether your hearing has changed. Your hearing aid may need to be adjusted.  Use other devices as needed. These may include:  Telephone amplifiers and hearing aids that can connect to a television, stereo, radio, or microphone.  Devices that use lights or vibrations. These alert you to the doorbell, a ringing telephone, or a baby monitor.  Television closed-captioning. This shows the words at the bottom of the screen. Most new TVs can do this.  TTY (text telephone). This lets you type messages back and forth on the telephone instead of talking or listening. These devices are also called TDD. When messages are typed on the keyboard, they are  "sent over the phone line to a receiving TTY. The message is shown on a monitor.  Use text messaging, social media, and email if it is hard for you to communicate by telephone.  Try to learn a listening technique called speechreading. It is not lipreading. You pay attention to people's gestures, expressions, posture, and tone of voice. These clues can help you understand what a person is saying. Face the person you are talking to, and have them face you. Make sure the lighting is good. You need to see the other person's face clearly.  Think about counseling if you need help to adjust to your hearing loss.  When should you call for help?  Watch closely for changes in your health, and be sure to contact your doctor if:    You think your hearing is getting worse.     You have new symptoms, such as dizziness or nausea.   Where can you learn more?  Go to https://www.[place].Opeepl/patiented  Enter R798 in the search box to learn more about \"Hearing Loss: Care Instructions.\"  Current as of: September 27, 2023  Content Version: 14.3    2024 [employer].   Care instructions adapted under license by your healthcare professional. If you have questions about a medical condition or this instruction, always ask your healthcare professional. [employer] disclaims any warranty or liability for your use of this information.    9 Ways to Cut Back on Drinking  Maybe you've found yourself drinking more alcohol than you'd prefer. If you want to cut back, here are some ideas to try.    Think before you drink.  Do you really want a drink, or is it just a habit? If you're used to having a drink at a certain time, try doing something else then.     Look for substitutes.  Find some no-alcohol drinks that you enjoy, like flavored seltzer water, tea with honey, or tonic with a slice of lime. Or try alcohol-free beer or \"virgin\" cocktails (without the alcohol).     Drink more water.  Use water to quench your thirst. Drink " "a glass of water before you have any alcohol. Have another glass along with every drink or between drinks.     Shrink your drink.  For example, have a bottle of beer instead of a pint. Use a smaller glass for wine. Choose drinks with lower alcohol content (ABV%). Or use less liquor and more mixer in cocktails.     Slow down.  It's easy to drink quickly and without thinking about it. Pay attention, and make each drink last longer.     Do the math.  Total up how much you spend on alcohol each month. How much is that a year? If you cut back, what could you do with the money you save?     Take a break.  Choose a day or two each week when you won't drink at all. Notice how you feel on those days, physically and emotionally. How did you sleep? Do you feel better? Over time, add more break days.     Count calories.  Would you like to lose some weight? For some people that's a good motivator for cutting back. Figure out how many calories are in each drink. How many does that add up to in a day? In a week? In a month?     Practice saying no.  Be ready when someone offers you a drink. Try: \"Thanks, I've had enough.\" Or \"Thanks, but I'm cutting back.\" Or \"No, thanks. I feel better when I drink less.\"   Current as of: November 15, 2023  Content Version: 14.3    2024 [employer].   Care instructions adapted under license by your healthcare professional. If you have questions about a medical condition or this instruction, always ask your healthcare professional. [employer] disclaims any warranty or liability for your use of this information.  Eating Healthy Foods: Care Instructions  With every meal, you can make healthy food choices. Try to eat a variety of fruits, vegetables, whole grains, lean proteins, and low-fat dairy products. This can help you get the right balance of nutrients, including vitamins and minerals. Small changes add up over time. You can start by adding one healthy food to your meals " "each day.    Try to make half your plate fruits and vegetables, one-fourth whole grains, and one-fourth lean proteins. Try including dairy with your meals.   Eat more fruits and vegetables. Try to have them with most meals and snacks.   Foods for healthy eating        Fruits   These can be fresh, frozen, canned, or dried.  Try to choose whole fruit rather than fruit juice.  Eat a variety of colors.        Vegetables   These can be fresh, frozen, canned, or dried.  Beans, peas, and lentils count too.        Whole grains   Choose whole-grain breads, cereals, and noodles.  Try brown rice.        Lean proteins   These can include lean meat, poultry, fish, and eggs.  You can also have tofu, beans, peas, lentils, nuts, and seeds.        Dairy   Try milk, yogurt, and cheese.  Choose low-fat or fat-free when you can.  If you need to, use lactose-free milk or fortified plant-based milk products, such as soy milk.        Water   Drink water when you're thirsty.  Limit sugar-sweetened drinks, including soda, fruit drinks, and sports drinks.  Where can you learn more?  Go to https://www.[employer].net/patiented  Enter T756 in the search box to learn more about \"Eating Healthy Foods: Care Instructions.\"  Current as of: September 20, 2023  Content Version: 14.3    2024 [employer].   Care instructions adapted under license by your healthcare professional. If you have questions about a medical condition or this instruction, always ask your healthcare professional. [employer] disclaims any warranty or liability for your use of this information.    Learning About Being Physically Active  What is physical activity?     Being physically active means doing any kind of activity that gets your body moving.  The types of physical activity that can help you get fit and stay healthy include:  Aerobic or \"cardio\" activities. These make your heart beat faster and make you breathe harder, such as brisk walking, riding a " "bike, or running. They strengthen your heart and lungs and build up your endurance.  Strength training activities. These make your muscles work against, or \"resist,\" something. Examples include lifting weights or doing push-ups. These activities help tone and strengthen your muscles and bones.  Stretches. These let you move your joints and muscles through their full range of motion. Stretching helps you be more flexible.  Reaching a balance between these three types of physical activity is important because each one contributes to your overall fitness.  What are the benefits of being active?  Being active is one of the best things you can do for your health. It helps you to:  Feel stronger and have more energy to do all the things you like to do.  Focus better at school or work.  Feel, think, and sleep better.  Reach and stay at a healthy weight.  Lose fat and build lean muscle.  Lower your risk for serious health problems, including diabetes, heart attack, high blood pressure, and some cancers.  Keep your heart, lungs, bones, muscles, and joints strong and healthy.  How can you make being active part of your life?  Start slowly. Make it your long-term goal to get at least 30 minutes of exercise on most days of the week. Walking is a good choice. You also may want to do other activities, such as running, swimming, cycling, or playing tennis or team sports.  Pick activities that you like--ones that make your heart beat faster, your muscles stronger, and your muscles and joints more flexible. If you find more than one thing you like doing, do them all. You don't have to do the same thing every day.  Get your heart pumping every day. Any activity that makes your heart beat faster and keeps it at that rate for a while counts.  Here are some great ways to get your heart beating faster:  Go for a brisk walk, run, or hike.  Go for a swim or bike ride.  Take an online exercise class or dance.  Play a game of touch football, " "basketball, or soccer.  Play tennis, pickleball, or racquetball.  Climb stairs.  Even some household chores can be aerobic. Just do them at a faster pace. Raking or mowing the lawn, sweeping the garage, and vacuuming and cleaning your home all can help get your heart rate up.  Strengthen your muscles during the week. You don't have to lift heavy weights or grow big, bulky muscles to get stronger. Doing a few simple activities that make your muscles work against, or \"resist,\" something can help you get stronger. Aim for at least twice a week.  For example, you can:  Do push-ups or sit-ups, which use your own body weight as resistance.  Lift weights or dumbbells or use stretch bands at home or in a gym or community center.  Stretch your muscles often. Stretching will help you as you become more active. It can help you stay flexible and loosen tight muscles. It can also help improve your balance and posture and can be a great way to relax.  Be sure to stretch the muscles you'll be using when you work out. It's best to warm your muscles slightly before you stretch them. Walk or do some other light aerobic activity for a few minutes. Then start stretching.  When you stretch your muscles:  Do it slowly. Stretching is not about going fast or making sudden movements.  Don't push or bounce during a stretch.  Hold each stretch for at least 15 to 30 seconds, if you can. You should feel a stretch in the muscle, but not pain.  Breathe out as you do the stretch. Then breathe in as you hold the stretch. Don't hold your breath.  If you're worried about how more activity might affect your health, have a checkup before you start. Follow any special advice your doctor gives you for getting a smart start.  Where can you learn more?  Go to https://www.healthwise.net/patiented  Enter W332 in the search box to learn more about \"Learning About Being Physically Active.\"  Current as of: July 31, 2024  Content Version: 14.3    2024 [employer].   Care instructions adapted under license by your healthcare professional. If you have questions about a medical condition or this instruction, always ask your healthcare professional. [facility] [employer] disclaims any warranty or liability for your use of this information.

## 2025-01-07 DIAGNOSIS — R73.01 IMPAIRED FASTING GLUCOSE: ICD-10-CM

## 2025-01-07 DIAGNOSIS — E83.52 HYPERCALCEMIA: ICD-10-CM

## 2025-01-07 DIAGNOSIS — N17.9 AKI (ACUTE KIDNEY INJURY): Primary | ICD-10-CM

## 2025-01-07 PROBLEM — R73.03 PREDIABETES: Status: ACTIVE | Noted: 2025-01-07

## 2025-01-07 LAB
EST. AVERAGE GLUCOSE BLD GHB EST-MCNC: 120 MG/DL
HBA1C MFR BLD: 5.8 % (ref 0–5.6)

## 2025-01-08 ENCOUNTER — LAB (OUTPATIENT)
Dept: LAB | Facility: CLINIC | Age: 75
End: 2025-01-08
Payer: MEDICARE

## 2025-01-08 ENCOUNTER — HOSPITAL ENCOUNTER (OUTPATIENT)
Dept: ULTRASOUND IMAGING | Facility: CLINIC | Age: 75
Discharge: HOME | End: 2025-01-08
Attending: INTERNAL MEDICINE
Payer: MEDICARE

## 2025-01-08 DIAGNOSIS — K11.8 MASS OF LEFT PAROTID GLAND: ICD-10-CM

## 2025-01-08 DIAGNOSIS — N17.9 AKI (ACUTE KIDNEY INJURY): ICD-10-CM

## 2025-01-08 DIAGNOSIS — E83.52 HYPERCALCEMIA: ICD-10-CM

## 2025-01-08 LAB — TOTAL PROTEIN SERUM FOR ELP: 7 G/DL (ref 6.4–8.3)

## 2025-01-08 PROCEDURE — 76770 US EXAM ABDO BACK WALL COMP: CPT

## 2025-01-08 PROCEDURE — 84155 ASSAY OF PROTEIN SERUM: CPT

## 2025-01-08 PROCEDURE — 76536 US EXAM OF HEAD AND NECK: CPT

## 2025-01-08 PROCEDURE — 36415 COLL VENOUS BLD VENIPUNCTURE: CPT

## 2025-02-20 ENCOUNTER — MYC MEDICAL ADVICE (OUTPATIENT)
Dept: INTERNAL MEDICINE | Facility: CLINIC | Age: 75
End: 2025-02-20
Payer: MEDICARE

## 2025-04-16 ENCOUNTER — TRANSFERRED RECORDS (OUTPATIENT)
Dept: HEALTH INFORMATION MANAGEMENT | Facility: CLINIC | Age: 75
End: 2025-04-16
Payer: MEDICARE

## 2025-05-21 ENCOUNTER — TRANSFERRED RECORDS (OUTPATIENT)
Dept: HEALTH INFORMATION MANAGEMENT | Facility: CLINIC | Age: 75
End: 2025-05-21
Payer: MEDICARE